# Patient Record
Sex: MALE | Race: WHITE | NOT HISPANIC OR LATINO | Employment: PART TIME | URBAN - METROPOLITAN AREA
[De-identification: names, ages, dates, MRNs, and addresses within clinical notes are randomized per-mention and may not be internally consistent; named-entity substitution may affect disease eponyms.]

---

## 2017-10-05 ENCOUNTER — TRANSCRIBE ORDERS (OUTPATIENT)
Dept: ADMINISTRATIVE | Facility: HOSPITAL | Age: 62
End: 2017-10-05

## 2017-10-05 DIAGNOSIS — R07.9 CHEST PAIN, UNSPECIFIED TYPE: Primary | ICD-10-CM

## 2017-10-26 ENCOUNTER — HOSPITAL ENCOUNTER (OUTPATIENT)
Dept: NON INVASIVE DIAGNOSTICS | Facility: CLINIC | Age: 62
Discharge: HOME | End: 2017-10-26
Payer: COMMERCIAL

## 2017-10-26 DIAGNOSIS — R07.9 CHEST PAIN, UNSPECIFIED TYPE: ICD-10-CM

## 2017-10-26 LAB
CHEST PAIN STATEMENT: NORMAL
MAX DIASTOLIC BP: 82 MMHG
MAX HEART RATE: 151 BPM
MAX PREDICTED HEART RATE: 157 BPM
MAX. SYSTOLIC BP: 190 MMHG
PROTOCOL NAME: NORMAL
TARGET HR FORMULA: NORMAL
TEST INDICATION: NORMAL
TIME IN EXERCISE PHASE: 405 S

## 2017-10-26 PROCEDURE — 93017 CV STRESS TEST TRACING ONLY: CPT

## 2017-10-26 PROCEDURE — A9502 TC99M TETROFOSMIN: HCPCS

## 2017-10-26 PROCEDURE — 78452 HT MUSCLE IMAGE SPECT MULT: CPT

## 2018-04-11 ENCOUNTER — HOSPITAL ENCOUNTER (EMERGENCY)
Facility: HOSPITAL | Age: 63
Discharge: HOME | End: 2018-04-11
Attending: EMERGENCY MEDICINE | Admitting: EMERGENCY MEDICINE
Payer: COMMERCIAL

## 2018-04-11 ENCOUNTER — APPOINTMENT (EMERGENCY)
Dept: CT IMAGING | Facility: HOSPITAL | Age: 63
End: 2018-04-11
Attending: EMERGENCY MEDICINE
Payer: COMMERCIAL

## 2018-04-11 ENCOUNTER — APPOINTMENT (EMERGENCY)
Dept: RADIOLOGY | Facility: HOSPITAL | Age: 63
End: 2018-04-11
Payer: COMMERCIAL

## 2018-04-11 VITALS
TEMPERATURE: 98.4 F | DIASTOLIC BLOOD PRESSURE: 68 MMHG | SYSTOLIC BLOOD PRESSURE: 132 MMHG | HEART RATE: 78 BPM | OXYGEN SATURATION: 94 % | WEIGHT: 195 LBS | RESPIRATION RATE: 18 BRPM

## 2018-04-11 DIAGNOSIS — B34.9 VIRAL ILLNESS: Primary | ICD-10-CM

## 2018-04-11 LAB
ALBUMIN SERPL BCP-MCNC: 3.8 G/DL (ref 3.5–5)
ALP SERPL-CCNC: 77 U/L (ref 46–116)
ALT SERPL W P-5'-P-CCNC: 44 U/L (ref 12–78)
ANION GAP SERPL CALCULATED.3IONS-SCNC: 16 MMOL/L (ref 4–13)
AST SERPL W P-5'-P-CCNC: 17 U/L (ref 5–45)
BACTERIA UR QL AUTO: NORMAL /HPF
BASOPHILS # BLD AUTO: 0.02 THOUSANDS/ΜL (ref 0–0.1)
BASOPHILS NFR BLD AUTO: 0 % (ref 0–1)
BILIRUB SERPL-MCNC: 0.6 MG/DL (ref 0.2–1)
BILIRUB UR QL STRIP: ABNORMAL
BUN SERPL-MCNC: 15 MG/DL (ref 5–25)
CALCIUM SERPL-MCNC: 8.9 MG/DL
CHLORIDE SERPL-SCNC: 103 MMOL/L (ref 100–108)
CLARITY UR: CLEAR
CO2 SERPL-SCNC: 21 MMOL/L (ref 21–32)
COLOR UR: YELLOW
CREAT SERPL-MCNC: 0.81 MG/DL (ref 0.6–1.3)
EOSINOPHIL # BLD AUTO: 0.12 THOUSAND/ΜL (ref 0–0.61)
EOSINOPHIL NFR BLD AUTO: 2 % (ref 0–6)
ERYTHROCYTE [DISTWIDTH] IN BLOOD BY AUTOMATED COUNT: 13.7 % (ref 11.6–15.1)
GFR SERPL CREATININE-BSD FRML MDRD: 95 ML/MIN/1.73SQ M
GLUCOSE SERPL-MCNC: 161 MG/DL (ref 65–140)
GLUCOSE UR STRIP-MCNC: ABNORMAL MG/DL
HCT VFR BLD AUTO: 48.3 % (ref 36.5–49.3)
HGB BLD-MCNC: 16.5 G/DL (ref 12–17)
HGB UR QL STRIP.AUTO: NEGATIVE
KETONES UR STRIP-MCNC: ABNORMAL MG/DL
LEUKOCYTE ESTERASE UR QL STRIP: NEGATIVE
LIPASE SERPL-CCNC: 87 U/L (ref 73–393)
LYMPHOCYTES # BLD AUTO: 1.82 THOUSANDS/ΜL (ref 0.6–4.47)
LYMPHOCYTES NFR BLD AUTO: 29 % (ref 14–44)
MCH RBC QN AUTO: 28.4 PG (ref 26.8–34.3)
MCHC RBC AUTO-ENTMCNC: 34.2 G/DL (ref 31.4–37.4)
MCV RBC AUTO: 83 FL (ref 82–98)
MONOCYTES # BLD AUTO: 0.72 THOUSAND/ΜL (ref 0.17–1.22)
MONOCYTES NFR BLD AUTO: 11 % (ref 4–12)
NEUTROPHILS # BLD AUTO: 3.71 THOUSANDS/ΜL (ref 1.85–7.62)
NEUTS SEG NFR BLD AUTO: 58 % (ref 43–75)
NITRITE UR QL STRIP: NEGATIVE
NON-SQ EPI CELLS URNS QL MICRO: NORMAL /HPF
PH UR STRIP.AUTO: 5 [PH] (ref 4.5–8)
PLATELET # BLD AUTO: 212 THOUSANDS/UL (ref 149–390)
PMV BLD AUTO: 10.2 FL (ref 8.9–12.7)
POTASSIUM SERPL-SCNC: 4 MMOL/L (ref 3.5–5.3)
PROT SERPL-MCNC: 7.1 G/DL (ref 6.4–8.2)
PROT UR STRIP-MCNC: ABNORMAL MG/DL
RBC # BLD AUTO: 5.8 MILLION/UL (ref 3.88–5.62)
RBC #/AREA URNS AUTO: NORMAL /HPF
SODIUM SERPL-SCNC: 140 MMOL/L (ref 136–145)
SP GR UR STRIP.AUTO: 1.02 (ref 1–1.03)
UROBILINOGEN UR QL STRIP.AUTO: 0.2 E.U./DL
WBC # BLD AUTO: 6.39 THOUSAND/UL (ref 4.31–10.16)
WBC #/AREA URNS AUTO: NORMAL /HPF

## 2018-04-11 PROCEDURE — 71045 X-RAY EXAM CHEST 1 VIEW: CPT

## 2018-04-11 PROCEDURE — 96361 HYDRATE IV INFUSION ADD-ON: CPT

## 2018-04-11 PROCEDURE — 85025 COMPLETE CBC W/AUTO DIFF WBC: CPT | Performed by: EMERGENCY MEDICINE

## 2018-04-11 PROCEDURE — 81001 URINALYSIS AUTO W/SCOPE: CPT

## 2018-04-11 PROCEDURE — 96375 TX/PRO/DX INJ NEW DRUG ADDON: CPT

## 2018-04-11 PROCEDURE — 36415 COLL VENOUS BLD VENIPUNCTURE: CPT | Performed by: EMERGENCY MEDICINE

## 2018-04-11 PROCEDURE — 80053 COMPREHEN METABOLIC PANEL: CPT | Performed by: EMERGENCY MEDICINE

## 2018-04-11 PROCEDURE — 93005 ELECTROCARDIOGRAM TRACING: CPT

## 2018-04-11 PROCEDURE — 96374 THER/PROPH/DIAG INJ IV PUSH: CPT

## 2018-04-11 PROCEDURE — 99285 EMERGENCY DEPT VISIT HI MDM: CPT

## 2018-04-11 PROCEDURE — 83690 ASSAY OF LIPASE: CPT | Performed by: EMERGENCY MEDICINE

## 2018-04-11 PROCEDURE — 74177 CT ABD & PELVIS W/CONTRAST: CPT

## 2018-04-11 RX ORDER — DICYCLOMINE HCL 20 MG
20 TABLET ORAL EVERY 6 HOURS
Qty: 20 TABLET | Refills: 0 | Status: SHIPPED | OUTPATIENT
Start: 2018-04-11 | End: 2019-12-05

## 2018-04-11 RX ORDER — ONDANSETRON 4 MG/1
4 TABLET, FILM COATED ORAL EVERY 6 HOURS
Qty: 12 TABLET | Refills: 0 | Status: SHIPPED | OUTPATIENT
Start: 2018-04-11 | End: 2019-12-05

## 2018-04-11 RX ORDER — ONDANSETRON 2 MG/ML
4 INJECTION INTRAMUSCULAR; INTRAVENOUS ONCE
Status: COMPLETED | OUTPATIENT
Start: 2018-04-11 | End: 2018-04-11

## 2018-04-11 RX ORDER — OMEPRAZOLE 20 MG/1
20 TABLET, DELAYED RELEASE ORAL DAILY
COMMUNITY
Start: 2015-11-07

## 2018-04-11 RX ORDER — KETOROLAC TROMETHAMINE 30 MG/ML
15 INJECTION, SOLUTION INTRAMUSCULAR; INTRAVENOUS ONCE
Status: COMPLETED | OUTPATIENT
Start: 2018-04-11 | End: 2018-04-11

## 2018-04-11 RX ADMIN — IOHEXOL 100 ML: 350 INJECTION, SOLUTION INTRAVENOUS at 20:40

## 2018-04-11 RX ADMIN — KETOROLAC TROMETHAMINE 15 MG: 30 INJECTION, SOLUTION INTRAMUSCULAR at 18:59

## 2018-04-11 RX ADMIN — ONDANSETRON 4 MG: 2 INJECTION INTRAMUSCULAR; INTRAVENOUS at 18:53

## 2018-04-11 RX ADMIN — SODIUM CHLORIDE 1000 ML: 0.9 INJECTION, SOLUTION INTRAVENOUS at 18:53

## 2018-04-11 NOTE — ED PROVIDER NOTES
History  Chief Complaint   Patient presents with    Abdominal Pain     Pt c/o lower abdominal pain for a few days along w/ nausea and vomiting  Pt c/o "looser stools" denies fevers     60year-old gentleman comes in with complaint of lower abdominal pain x3 days  Patient also complains of nausea vomiting and several episodes of loose stool  Patient describes abdominal pain as crampy  Patient thought he just had a GI bug but it is the lateral that is lasting longer he is concerned that there is something more wrong  Patient denies any fevers  Patient also denies any previous similar episodes  History provided by:  Patient   used: No    Abdominal Pain   Pain location:  Periumbilical and suprapubic  Pain quality: cramping    Pain radiates to:  Does not radiate  Pain severity:  Mild  Onset quality:  Gradual  Duration:  3 days  Timing:  Intermittent  Progression:  Waxing and waning  Chronicity:  New  Context: not alcohol use, not previous surgeries and not trauma    Ineffective treatments:  None tried  Associated symptoms: no anorexia, no chest pain, no cough, no fever, no hematuria and no vomiting    Risk factors: no alcohol abuse, no NSAID use and not pregnant        Prior to Admission Medications   Prescriptions Last Dose Informant Patient Reported? Taking? Albuterol Sulfate (PROAIR RESPICLICK) 050 (90 Base) MCG/ACT AEPB   Yes Yes   omeprazole (PRILOSEC OTC) 20 MG tablet   Yes Yes   Sig: Take 20 mg by mouth daily      Facility-Administered Medications: None       Past Medical History:   Diagnosis Date    Diabetes mellitus ([facility] [address] )        History reviewed  No pertinent surgical history  History reviewed  No pertinent family history  I have reviewed and agree with the history as documented      Social History   Substance Use Topics    Smoking status: Never Smoker    Smokeless tobacco: Not on file    Alcohol use No        Review of Systems   Constitutional: Negative for activity change, appetite change and fever  HENT: Negative for congestion and facial swelling  Eyes: Negative for discharge and redness  Respiratory: Negative for cough and wheezing  Cardiovascular: Negative for chest pain and leg swelling  Gastrointestinal: Positive for abdominal pain  Negative for abdominal distention, anorexia, blood in stool and vomiting  Endocrine: Negative for cold intolerance and polydipsia  Genitourinary: Negative for difficulty urinating and hematuria  Musculoskeletal: Negative for arthralgias and gait problem  Skin: Negative for color change and rash  Allergic/Immunologic: Negative for food allergies and immunocompromised state  Neurological: Negative for dizziness, seizures and headaches  Hematological: Negative for adenopathy  Does not bruise/bleed easily  Psychiatric/Behavioral: Negative for agitation, confusion and decreased concentration  All other systems reviewed and are negative  Physical Exam  ED Triage Vitals [04/11/18 1743]   Temperature Pulse Respirations Blood Pressure SpO2   98 4 °F (36 9 °C) (!) 106 18 142/66 99 %      Temp Source Heart Rate Source Patient Position - Orthostatic VS BP Location FiO2 (%)   Oral Monitor Sitting Left arm --      Pain Score       No Pain           Orthostatic Vital Signs  Vitals:    04/11/18 1743 04/11/18 1845 04/11/18 2100   BP: 142/66 155/82 132/68   Pulse: (!) 106 84 78   Patient Position - Orthostatic VS: Sitting Lying Lying       Physical Exam   Constitutional: He is oriented to person, place, and time  He appears well-developed and well-nourished  Non-toxic appearance  HENT:   Head: Normocephalic and atraumatic  Right Ear: Tympanic membrane normal    Left Ear: Tympanic membrane normal    Nose: Nose normal    Mouth/Throat: Oropharynx is clear and moist    Eyes: Conjunctivae, EOM and lids are normal  Pupils are equal, round, and reactive to light  Neck: Trachea normal and normal range of motion  Neck supple  No JVD present  Carotid bruit is not present  Cardiovascular: Normal rate, regular rhythm, normal heart sounds and intact distal pulses  No extrasystoles are present  Pulmonary/Chest: Effort normal  He has no decreased breath sounds  He has no wheezes  He has no rhonchi  He has no rales  He exhibits no tenderness and no deformity  Abdominal: Soft  Bowel sounds are normal  There is tenderness in the periumbilical area and suprapubic area  There is no rebound, no guarding and no CVA tenderness  Musculoskeletal:        Right shoulder: He exhibits normal range of motion, no tenderness, no swelling and no deformity  Cervical back: Normal  He exhibits normal range of motion, no tenderness, no bony tenderness and no deformity  Lymphadenopathy:     He has no cervical adenopathy  He has no axillary adenopathy  Neurological: He is alert and oriented to person, place, and time  He has normal strength and normal reflexes  No cranial nerve deficit or sensory deficit  He displays a negative Romberg sign  Skin: Skin is warm and dry  Psychiatric: He has a normal mood and affect  His speech is normal and behavior is normal  Judgment and thought content normal  Cognition and memory are normal    Nursing note and vitals reviewed        ED Medications  Medications   ketorolac (TORADOL) injection 15 mg (15 mg Intravenous Given 4/11/18 1859)   ondansetron (ZOFRAN) injection 4 mg (4 mg Intravenous Given 4/11/18 1853)   sodium chloride 0 9 % bolus 1,000 mL (0 mL Intravenous Stopped 4/11/18 2138)   iohexol (OMNIPAQUE) 350 MG/ML injection (MULTI-DOSE) 100 mL (100 mL Intravenous Given 4/11/18 2040)       Diagnostic Studies  Results Reviewed     Procedure Component Value Units Date/Time    Comprehensive metabolic panel [[id number]]  (Abnormal) Collected:  04/11/18 1953    Lab Status:  Final result Specimen:  Blood from Arm, Right Updated:  04/11/18 2028     Sodium 140 mmol/L      Potassium 4 0 mmol/L      Chloride 103 mmol/L      CO2 21 mmol/L      Anion Gap 16 (H) mmol/L      BUN 15 mg/dL      Creatinine 0 81 mg/dL      Glucose 161 (H) mg/dL      Calcium 8 9 mg/dL      AST 17 U/L      ALT 44 U/L      Alkaline Phosphatase 77 U/L      Total Protein 7 1 g/dL      Albumin 3 8 g/dL      Total Bilirubin 0 60 mg/dL      eGFR 95 ml/min/1 73sq m     Narrative:         National Kidney Disease Education Program recommendations are as follows:  GFR calculation is accurate only with a steady state creatinine  Chronic Kidney disease less than 60 ml/min/1 73 sq  meters  Kidney failure less than 15 ml/min/1 73 sq  meters      Lipase [[id number]]  (Normal) Collected:  04/11/18 1953    Lab Status:  Final result Specimen:  Blood from Arm, Right Updated:  04/11/18 2028     Lipase 87 u/L     Urine Microscopic [[id number]]  (Normal) Collected:  04/11/18 1939    Lab Status:  Final result Specimen:  Urine from Urine, Clean Catch Updated:  04/11/18 2024     RBC, UA None Seen /hpf      WBC, UA None Seen /hpf      Epithelial Cells None Seen /hpf      Bacteria, UA Occasional /hpf     ED Urine Macroscopic [[id number]]  (Abnormal) Collected:  04/11/18 1939    Lab Status:  Final result Specimen:  Urine Updated:  04/11/18 1938     Color, UA Yellow     Clarity, UA Clear     pH, UA 5 0     Leukocytes, UA Negative     Nitrite, UA Negative     Protein, UA Trace (A) mg/dl      Glucose,  (1/2%) (A) mg/dl      Ketones, UA >=160 (4+) (A) mg/dl      Urobilinogen, UA 0 2 E U /dl      Bilirubin, UA Interference- unable to analyze (A)     Blood, UA Negative     Specific Gravity, UA 1 020    Narrative:       CLINITEK RESULT    CBC and differential [[id number]]  (Abnormal) Collected:  04/11/18 1843    Lab Status:  Final result Specimen:  Blood from Arm, Right Updated:  04/11/18 1855     WBC 6 39 Thousand/uL      RBC 5 80 (H) Million/uL      Hemoglobin 16 5 g/dL      Hematocrit 48 3 %      MCV 83 fL      MCH 28 4 pg      MCHC 34 2 g/dL      RDW 13 7 %      MPV 10 2 fL Platelets 010 Thousands/uL      Neutrophils Relative 58 %      Lymphocytes Relative 29 %      Monocytes Relative 11 %      Eosinophils Relative 2 %      Basophils Relative 0 %      Neutrophils Absolute 3 71 Thousands/µL      Lymphocytes Absolute 1 82 Thousands/µL      Monocytes Absolute 0 72 Thousand/µL      Eosinophils Absolute 0 12 Thousand/µL      Basophils Absolute 0 02 Thousands/µL                  CT abdomen pelvis with contrast   Final Result by Alexandra Boogie MD (04/11 2053)      No acute intra-abdominal abnormality  No free air or free fluid  Scattered colonic diverticulosis with no inflammatory changes present to suggest acute diverticulitis  Diffuse fatty infiltration of the liver  Cholelithiasis with no pericholecystic inflammatory change  Workstation performed: [id number]         XR chest 1 view portable   Final Result by Sree Tamez MD (04/11 2029)      No acute cardiopulmonary disease  Workstation performed: [id number]                    Procedures  Procedures       Phone Contacts  ED Phone Contact    ED Course  ED Course                                MDM  Number of Diagnoses or Management Options  Viral illness: new and requires workup     Amount and/or Complexity of Data Reviewed  Clinical lab tests: ordered and reviewed  Tests in the radiology section of CPT®: ordered and reviewed  Tests in the medicine section of CPT®: ordered and reviewed    Patient Progress  Patient progress: improved    CritCare Time    Disposition  Final diagnoses:   Viral illness     Time reflects when diagnosis was documented in both MDM as applicable and the Disposition within this note     Time User Action Codes Description Comment    4/11/2018  9:03 PM Anisa Denise Add [B34 9] Viral illness       ED Disposition     ED Disposition Condition Comment    Discharge  [address] [facility] discharge to home/self care      Condition at discharge: Good        Follow-up Information Follow up With Specialties Details Why Contact Info    Ryan Jones MD [facility] Medicine Schedule an appointment as soon as possible for a visit  [address]          Discharge Medication List as of 4/11/2018  9:08 PM      START taking these medications    Details   dicyclomine (BENTYL) 20 mg tablet Take 1 tablet (20 mg total) by mouth every 6 (six) hours For crampy abdominal pain, Starting Wed 4/11/2018, Print      ondansetron (ZOFRAN) 4 mg tablet Take 1 tablet (4 mg total) by mouth every 6 (six) hours, Starting Wed 4/11/2018, Print         CONTINUE these medications which have NOT CHANGED    Details   Albuterol Sulfate (PROAIR RESPICLICK) 328 (90 Base) MCG/ACT AEPB Starting Wed 9/16/2015, Historical Med      omeprazole (PRILOSEC OTC) 20 MG tablet Take 20 mg by mouth daily, Starting Sat 11/7/2015, Historical Med           No discharge procedures on file      ED Provider  Electronically Signed by           Derrick Staples DO  04/15/18 1400

## 2018-04-12 LAB
ATRIAL RATE: 79 BPM
P AXIS: 33 DEGREES
PR INTERVAL: 140 MS
QRS AXIS: 57 DEGREES
QRSD INTERVAL: 84 MS
QT INTERVAL: 360 MS
QTC INTERVAL: 412 MS
T WAVE AXIS: 58 DEGREES
VENTRICULAR RATE: 79 BPM

## 2018-04-12 PROCEDURE — 93010 ELECTROCARDIOGRAM REPORT: CPT | Performed by: INTERNAL MEDICINE

## 2018-04-12 NOTE — ED NOTES
PT awake and alert, no distress noted, no further questions       April Argentina Winkler, RN  04/11/18 2247

## 2018-04-12 NOTE — DISCHARGE INSTRUCTIONS
Viral Syndrome, Ambulatory Care   GENERAL INFORMATION:   Viral syndrome  is a term healthcare providers use for general symptoms of a viral infection that has no clear cause  Common symptoms include the following:   · Fever and chills, or a rash    · Runny or stuffy nose     · Cough, sore throat, or hoarseness     · Headache, or pain and pressure around your eyes     · Muscle aches and joint pain     · Shortness of breath or wheezing     · Abdominal pain, cramps, and diarrhea     · Nausea, vomiting, or loss of appetite  Seek immediate care for the following symptoms:   · Continued vomiting and diarrhea    · Chest pain or trouble breathing  Treatment for a viral syndrome  may include medicines to decrease fever, cough, or stuffy nose  You may also need medicines to relieve a rash, itching, or help treat the viral infection  Manage your symptoms:  Drink liquids as directed to help prevent dehydration  Ask how much liquid to drink each day and which liquids are best for you  You may need to drink an oral rehydration solution (ORS)  An ORS has the right amounts of water, salts, and sugar you need to replace body fluids  Prevent the spread of viral syndrome:   · Wash your hands often  Use soap and water  Wash your hands after you use the bathroom, change a child's diapers, or sneeze  Wash your hands before you prepare or eat food  Use a gel hand  if soap and water are not available  · Wear a mask  to help prevent spreading the virus to others  · Cook and handle food properly  Cook food completely through  Clean food preparation surfaces with a disinfectant  · Ask about vaccinations  You may need an influenza (flu) vaccine, pneumococcal vaccine, or meningococcal vaccine  These vaccines help prevent the flu, pneumonia, and meningococcal disease  Follow up with your healthcare provider as directed:  Write down your questions so you remember to ask them during your visits     CARE AGREEMENT:   You have the right to help plan your care  Learn about your health condition and how it may be treated  Discuss treatment options with your caregivers to decide what care you want to receive  You always have the right to refuse treatment  The above information is an  only  It is not intended as medical advice for individual conditions or treatments  Talk to your doctor, nurse or pharmacist before following any medical regimen to see if it is safe and effective for you  © 2014 [address] is for End User's use only and may not be sold, redistributed or otherwise used for commercial purposes  All illustrations and images included in CareNotes® are the copyrighted property of [employer]  or Lenin Holman

## 2019-12-05 ENCOUNTER — HOSPITAL ENCOUNTER (OUTPATIENT)
Facility: HOSPITAL | Age: 64
Setting detail: OBSERVATION
Discharge: HOME | End: 2019-12-05
Attending: EMERGENCY MEDICINE | Admitting: FAMILY MEDICINE
Payer: COMMERCIAL

## 2019-12-05 ENCOUNTER — APPOINTMENT (EMERGENCY)
Dept: RADIOLOGY | Facility: HOSPITAL | Age: 64
End: 2019-12-05
Payer: COMMERCIAL

## 2019-12-05 VITALS
SYSTOLIC BLOOD PRESSURE: 126 MMHG | RESPIRATION RATE: 19 BRPM | DIASTOLIC BLOOD PRESSURE: 64 MMHG | OXYGEN SATURATION: 97 % | TEMPERATURE: 98 F | WEIGHT: 199.08 LBS | BODY MASS INDEX: 26.96 KG/M2 | HEART RATE: 74 BPM | HEIGHT: 72 IN

## 2019-12-05 DIAGNOSIS — R07.9 CHEST PAIN: Primary | ICD-10-CM

## 2019-12-05 DIAGNOSIS — J40 BRONCHITIS: ICD-10-CM

## 2019-12-05 PROBLEM — R07.89 CHEST HEAVINESS: Status: ACTIVE | Noted: 2019-12-05

## 2019-12-05 PROBLEM — E11.9 DIABETES MELLITUS (HCC): Status: ACTIVE | Noted: 2019-12-05

## 2019-12-05 PROBLEM — R05.9 COUGH: Status: ACTIVE | Noted: 2019-12-05

## 2019-12-05 LAB
ALBUMIN SERPL BCP-MCNC: 3.8 G/DL (ref 3.5–5)
ALP SERPL-CCNC: 65 U/L (ref 46–116)
ALT SERPL W P-5'-P-CCNC: 22 U/L (ref 12–78)
ANION GAP SERPL CALCULATED.3IONS-SCNC: 9 MMOL/L (ref 4–13)
APTT PPP: 29 SECONDS (ref 23–37)
AST SERPL W P-5'-P-CCNC: 12 U/L (ref 5–45)
BASE EX.OXY STD BLDV CALC-SCNC: 81.9 % (ref 60–80)
BASE EXCESS BLDV CALC-SCNC: -1.6 MMOL/L
BASOPHILS # BLD AUTO: 0.05 THOUSANDS/ΜL (ref 0–0.1)
BASOPHILS NFR BLD AUTO: 1 % (ref 0–1)
BILIRUB SERPL-MCNC: 0.5 MG/DL (ref 0.2–1)
BUN SERPL-MCNC: 22 MG/DL (ref 5–25)
CALCIUM SERPL-MCNC: 9 MG/DL (ref 8.3–10.1)
CHLORIDE SERPL-SCNC: 105 MMOL/L (ref 100–108)
CHOLEST SERPL-MCNC: 232 MG/DL (ref 50–200)
CK SERPL-CCNC: 36 U/L (ref 39–308)
CO2 SERPL-SCNC: 25 MMOL/L (ref 21–32)
CREAT SERPL-MCNC: 1.02 MG/DL (ref 0.6–1.3)
D DIMER PPP FEU-MCNC: 0.36 UG/ML FEU
EOSINOPHIL # BLD AUTO: 0.35 THOUSAND/ΜL (ref 0–0.61)
EOSINOPHIL NFR BLD AUTO: 6 % (ref 0–6)
ERYTHROCYTE [DISTWIDTH] IN BLOOD BY AUTOMATED COUNT: 13.1 % (ref 11.6–15.1)
EST. AVERAGE GLUCOSE BLD GHB EST-MCNC: 146 MG/DL
FLUAV RNA NPH QL NAA+PROBE: NORMAL
FLUBV RNA NPH QL NAA+PROBE: NORMAL
GFR SERPL CREATININE-BSD FRML MDRD: 77 ML/MIN/1.73SQ M
GLUCOSE SERPL-MCNC: 120 MG/DL (ref 65–140)
GLUCOSE SERPL-MCNC: 147 MG/DL (ref 65–140)
HBA1C MFR BLD: 6.7 % (ref 4.2–6.3)
HCO3 BLDV-SCNC: 22.9 MMOL/L (ref 24–30)
HCT VFR BLD AUTO: 45.4 % (ref 36.5–49.3)
HDLC SERPL-MCNC: 44 MG/DL
HGB BLD-MCNC: 15.3 G/DL (ref 12–17)
IMM GRANULOCYTES # BLD AUTO: 0.02 THOUSAND/UL (ref 0–0.2)
IMM GRANULOCYTES NFR BLD AUTO: 0 % (ref 0–2)
INR PPP: 0.92 (ref 0.84–1.19)
LACTATE SERPL-SCNC: 0.9 MMOL/L (ref 0.5–2)
LDLC SERPL CALC-MCNC: 171 MG/DL (ref 0–100)
LIPASE SERPL-CCNC: 120 U/L (ref 73–393)
LYMPHOCYTES # BLD AUTO: 2.19 THOUSANDS/ΜL (ref 0.6–4.47)
LYMPHOCYTES NFR BLD AUTO: 36 % (ref 14–44)
MCH RBC QN AUTO: 29.7 PG (ref 26.8–34.3)
MCHC RBC AUTO-ENTMCNC: 33.7 G/DL (ref 31.4–37.4)
MCV RBC AUTO: 88 FL (ref 82–98)
MONOCYTES # BLD AUTO: 0.61 THOUSAND/ΜL (ref 0.17–1.22)
MONOCYTES NFR BLD AUTO: 10 % (ref 4–12)
NEUTROPHILS # BLD AUTO: 2.87 THOUSANDS/ΜL (ref 1.85–7.62)
NEUTS SEG NFR BLD AUTO: 47 % (ref 43–75)
NONHDLC SERPL-MCNC: 188 MG/DL
NRBC BLD AUTO-RTO: 0 /100 WBCS
NT-PROBNP SERPL-MCNC: 14 PG/ML
O2 CT BLDV-SCNC: 18.4 ML/DL
PCO2 BLDV: 38 MM HG (ref 42–50)
PH BLDV: 7.4 [PH] (ref 7.3–7.4)
PLATELET # BLD AUTO: 204 THOUSANDS/UL (ref 149–390)
PLATELET # BLD AUTO: 208 THOUSANDS/UL (ref 149–390)
PMV BLD AUTO: 9.5 FL (ref 8.9–12.7)
PMV BLD AUTO: 9.6 FL (ref 8.9–12.7)
PO2 BLDV: 46.2 MM HG (ref 35–45)
POTASSIUM SERPL-SCNC: 3.7 MMOL/L (ref 3.5–5.3)
PROCALCITONIN SERPL-MCNC: <0.05 NG/ML
PROT SERPL-MCNC: 7 G/DL (ref 6.4–8.2)
PROTHROMBIN TIME: 11.8 SECONDS (ref 11.6–14.5)
RBC # BLD AUTO: 5.16 MILLION/UL (ref 3.88–5.62)
RSV RNA NPH QL NAA+PROBE: NORMAL
SODIUM SERPL-SCNC: 139 MMOL/L (ref 136–145)
TRIGL SERPL-MCNC: 83 MG/DL
TROPONIN I SERPL-MCNC: 0 NG/ML
TROPONIN I SERPL-MCNC: <0.02 NG/ML
WBC # BLD AUTO: 6.09 THOUSAND/UL (ref 4.31–10.16)

## 2019-12-05 PROCEDURE — 84484 ASSAY OF TROPONIN QUANT: CPT | Performed by: PHYSICIAN ASSISTANT

## 2019-12-05 PROCEDURE — 80061 LIPID PANEL: CPT | Performed by: PHYSICIAN ASSISTANT

## 2019-12-05 PROCEDURE — 85610 PROTHROMBIN TIME: CPT | Performed by: EMERGENCY MEDICINE

## 2019-12-05 PROCEDURE — 99285 EMERGENCY DEPT VISIT HI MDM: CPT | Performed by: EMERGENCY MEDICINE

## 2019-12-05 PROCEDURE — 84145 PROCALCITONIN (PCT): CPT | Performed by: PHYSICIAN ASSISTANT

## 2019-12-05 PROCEDURE — 94640 AIRWAY INHALATION TREATMENT: CPT

## 2019-12-05 PROCEDURE — 94664 DEMO&/EVAL PT USE INHALER: CPT

## 2019-12-05 PROCEDURE — 87040 BLOOD CULTURE FOR BACTERIA: CPT | Performed by: EMERGENCY MEDICINE

## 2019-12-05 PROCEDURE — 99285 EMERGENCY DEPT VISIT HI MDM: CPT

## 2019-12-05 PROCEDURE — 36415 COLL VENOUS BLD VENIPUNCTURE: CPT | Performed by: EMERGENCY MEDICINE

## 2019-12-05 PROCEDURE — 85025 COMPLETE CBC W/AUTO DIFF WBC: CPT | Performed by: EMERGENCY MEDICINE

## 2019-12-05 PROCEDURE — 83690 ASSAY OF LIPASE: CPT | Performed by: EMERGENCY MEDICINE

## 2019-12-05 PROCEDURE — 85379 FIBRIN DEGRADATION QUANT: CPT | Performed by: EMERGENCY MEDICINE

## 2019-12-05 PROCEDURE — 99220 PR INITIAL OBSERVATION CARE/DAY 70 MINUTES: CPT | Performed by: PHYSICIAN ASSISTANT

## 2019-12-05 PROCEDURE — 94760 N-INVAS EAR/PLS OXIMETRY 1: CPT

## 2019-12-05 PROCEDURE — 82550 ASSAY OF CK (CPK): CPT | Performed by: EMERGENCY MEDICINE

## 2019-12-05 PROCEDURE — 85049 AUTOMATED PLATELET COUNT: CPT | Performed by: PHYSICIAN ASSISTANT

## 2019-12-05 PROCEDURE — 82805 BLOOD GASES W/O2 SATURATION: CPT | Performed by: EMERGENCY MEDICINE

## 2019-12-05 PROCEDURE — 85730 THROMBOPLASTIN TIME PARTIAL: CPT | Performed by: EMERGENCY MEDICINE

## 2019-12-05 PROCEDURE — 84484 ASSAY OF TROPONIN QUANT: CPT | Performed by: EMERGENCY MEDICINE

## 2019-12-05 PROCEDURE — 83605 ASSAY OF LACTIC ACID: CPT | Performed by: EMERGENCY MEDICINE

## 2019-12-05 PROCEDURE — 93005 ELECTROCARDIOGRAM TRACING: CPT

## 2019-12-05 PROCEDURE — 80053 COMPREHEN METABOLIC PANEL: CPT | Performed by: EMERGENCY MEDICINE

## 2019-12-05 PROCEDURE — 87631 RESP VIRUS 3-5 TARGETS: CPT | Performed by: EMERGENCY MEDICINE

## 2019-12-05 PROCEDURE — 83036 HEMOGLOBIN GLYCOSYLATED A1C: CPT | Performed by: PHYSICIAN ASSISTANT

## 2019-12-05 PROCEDURE — 99217 PR OBSERVATION CARE DISCHARGE MANAGEMENT: CPT | Performed by: HOSPITALIST

## 2019-12-05 PROCEDURE — 83880 ASSAY OF NATRIURETIC PEPTIDE: CPT | Performed by: EMERGENCY MEDICINE

## 2019-12-05 PROCEDURE — 71046 X-RAY EXAM CHEST 2 VIEWS: CPT

## 2019-12-05 PROCEDURE — 82948 REAGENT STRIP/BLOOD GLUCOSE: CPT

## 2019-12-05 RX ORDER — ACETAMINOPHEN 325 MG/1
650 TABLET ORAL EVERY 4 HOURS PRN
Status: DISCONTINUED | OUTPATIENT
Start: 2019-12-05 | End: 2019-12-05 | Stop reason: HOSPADM

## 2019-12-05 RX ORDER — ASPIRIN 81 MG/1
162 TABLET, CHEWABLE ORAL ONCE
Status: COMPLETED | OUTPATIENT
Start: 2019-12-05 | End: 2019-12-05

## 2019-12-05 RX ORDER — ALBUTEROL SULFATE 2.5 MG/3ML
2.5 SOLUTION RESPIRATORY (INHALATION) ONCE
Status: COMPLETED | OUTPATIENT
Start: 2019-12-05 | End: 2019-12-05

## 2019-12-05 RX ORDER — LEVALBUTEROL 1.25 MG/.5ML
1.25 SOLUTION, CONCENTRATE RESPIRATORY (INHALATION)
Status: DISCONTINUED | OUTPATIENT
Start: 2019-12-05 | End: 2019-12-05 | Stop reason: HOSPADM

## 2019-12-05 RX ORDER — ONDANSETRON 2 MG/ML
4 INJECTION INTRAMUSCULAR; INTRAVENOUS EVERY 6 HOURS PRN
Status: DISCONTINUED | OUTPATIENT
Start: 2019-12-05 | End: 2019-12-05 | Stop reason: HOSPADM

## 2019-12-05 RX ORDER — SODIUM CHLORIDE FOR INHALATION 0.9 %
VIAL, NEBULIZER (ML) INHALATION
Status: COMPLETED
Start: 2019-12-05 | End: 2019-12-05

## 2019-12-05 RX ORDER — ALBUTEROL SULFATE 2.5 MG/3ML
2.5 SOLUTION RESPIRATORY (INHALATION) EVERY 6 HOURS PRN
Status: DISCONTINUED | OUTPATIENT
Start: 2019-12-05 | End: 2019-12-05 | Stop reason: HOSPADM

## 2019-12-05 RX ORDER — BENZONATATE 100 MG/1
100 CAPSULE ORAL 3 TIMES DAILY PRN
Status: DISCONTINUED | OUTPATIENT
Start: 2019-12-05 | End: 2019-12-05 | Stop reason: HOSPADM

## 2019-12-05 RX ORDER — PANTOPRAZOLE SODIUM 40 MG/1
40 TABLET, DELAYED RELEASE ORAL
Status: DISCONTINUED | OUTPATIENT
Start: 2019-12-05 | End: 2019-12-05 | Stop reason: HOSPADM

## 2019-12-05 RX ORDER — GUAIFENESIN 600 MG
600 TABLET, EXTENDED RELEASE 12 HR ORAL EVERY 12 HOURS SCHEDULED
Status: DISCONTINUED | OUTPATIENT
Start: 2019-12-05 | End: 2019-12-05 | Stop reason: HOSPADM

## 2019-12-05 RX ADMIN — LEVALBUTEROL 1.25 MG: 1.25 SOLUTION, CONCENTRATE RESPIRATORY (INHALATION) at 07:16

## 2019-12-05 RX ADMIN — IPRATROPIUM BROMIDE 0.5 MG: 0.5 SOLUTION RESPIRATORY (INHALATION) at 07:16

## 2019-12-05 RX ADMIN — ASPIRIN 81 MG 162 MG: 81 TABLET ORAL at 00:44

## 2019-12-05 RX ADMIN — ALBUTEROL SULFATE 2.5 MG: 2.5 SOLUTION RESPIRATORY (INHALATION) at 00:44

## 2019-12-05 RX ADMIN — ISODIUM CHLORIDE 3 ML: 0.03 SOLUTION RESPIRATORY (INHALATION) at 00:44

## 2019-12-05 RX ADMIN — PANTOPRAZOLE SODIUM 40 MG: 40 TABLET, DELAYED RELEASE ORAL at 06:31

## 2019-12-05 NOTE — ED PROVIDER NOTES
History  Chief Complaint   Patient presents with    Breathing Difficulty     Pt reports having a cough, labored breathing, and wheezing that has been going on for several days  -fevers     Patient is a 71year old male with a few days of worsening chest heaviness pain, sob, wheezing, orthopnea and productive cough  No fever  No N/V  No travel  Denies smoking  No CAD in family  Has been taking Bronkaid over the counter for wheezing and last used this at [facility] website checked on this patient and no Rx found  Was last seen in this ED on 4/11/18 for viral illness  History provided by:  Patient   used: No        Prior to Admission Medications   Prescriptions Last Dose Informant Patient Reported? Taking? Albuterol Sulfate (PROAIR RESPICLICK) 443 (90 Base) MCG/ACT AEPB   Yes No   omeprazole (PRILOSEC OTC) 20 MG tablet   Yes No   Sig: Take 20 mg by mouth daily      Facility-Administered Medications: None       Past Medical History:   Diagnosis Date    Diabetes mellitus ([facility] [address] )        History reviewed  No pertinent surgical history  History reviewed  No pertinent family history  I have reviewed and agree with the history as documented  Social History     Tobacco Use    Smoking status: Never Smoker    Smokeless tobacco: Never Used   Substance Use Topics    Alcohol use: No    Drug use: No        Review of Systems   Constitutional: Negative for fever  Respiratory: Positive for cough, shortness of breath and wheezing  Cardiovascular: Positive for chest pain  Gastrointestinal: Negative for nausea and vomiting  All other systems reviewed and are negative  Physical Exam  Physical Exam   Constitutional: He is oriented to person, place, and time  He appears well-developed and well-nourished  He appears distressed (moderate)  HENT:   Head: Normocephalic and atraumatic  Mouth/Throat: Oropharynx is clear and moist    Eyes: No scleral icterus     Neck: Normal range of motion  Neck supple  No JVD present  No tracheal deviation present  Cardiovascular: Normal rate, regular rhythm and normal heart sounds  No murmur heard  Pulmonary/Chest: Effort normal and breath sounds normal  No stridor  No respiratory distress  He has no wheezes  He has no rales  He exhibits no tenderness  Abdominal: Soft  Bowel sounds are normal  He exhibits no distension  There is no tenderness  Musculoskeletal: He exhibits no edema or deformity  Neurological: He is alert and oriented to person, place, and time  Skin: Skin is warm and dry  No rash noted  Psychiatric:   Somewhat anxious  Nursing note and vitals reviewed        Vital Signs  ED Triage Vitals   Temperature Pulse Respirations Blood Pressure SpO2   12/05/19 0009 12/05/19 0009 12/05/19 0009 12/05/19 0009 12/05/19 0010   98 5 °F (36 9 °C) 87 20 141/64 95 %      Temp Source Heart Rate Source Patient Position - Orthostatic VS BP Location FiO2 (%)   12/05/19 0009 12/05/19 0009 12/05/19 0009 12/05/19 0009 --   Oral Monitor Sitting Left arm       Pain Score       12/05/19 0009       2           Vitals:    12/05/19 0009 12/05/19 0124   BP: 141/64 118/70   Pulse: 87 80   Patient Position - Orthostatic VS: Sitting Sitting         Visual Acuity      ED Medications  Medications   nitroglycerin (NITRO-BID) 2 % TD ointment 0 5 inch (0 inches Topical Hold 12/5/19 0044)   albuterol inhalation solution 2 5 mg (2 5 mg Nebulization Given 12/5/19 0044)   aspirin chewable tablet 162 mg (162 mg Oral Given 12/5/19 0044)   sodium chloride 0 9 % inhalation solution **ADS Override Pull** (3 mL  Given 12/5/19 0044)       Diagnostic Studies  Results Reviewed     Procedure Component Value Units Date/Time    Influenza A/B and RSV PCR [[id number]]  (Normal) Collected:  12/05/19 0057    Lab Status:  Final result Specimen:  Nasopharyngeal from Nose Updated:  12/05/19 0155     INFLUENZA A PCR None Detected     INFLUENZA B PCR None Detected     RSV PCR None Detected    Comprehensive metabolic panel [[id number]]  (Abnormal) Collected:  12/05/19 0057    Lab Status:  Final result Specimen:  Blood from Arm, Right Updated:  12/05/19 0135     Sodium 139 mmol/L      Potassium 3 7 mmol/L      Chloride 105 mmol/L      CO2 25 mmol/L      ANION GAP 9 mmol/L      BUN 22 mg/dL      Creatinine 1 02 mg/dL      Glucose 147 mg/dL      Calcium 9 0 mg/dL      AST 12 U/L      ALT 22 U/L      Alkaline Phosphatase 65 U/L      Total Protein 7 0 g/dL      Albumin 3 8 g/dL      Total Bilirubin 0 50 mg/dL      eGFR 77 ml/min/1 73sq m     Narrative:       [place] guidelines for Chronic Kidney Disease (CKD):     Stage 1 with normal or high GFR (GFR > 90 mL/min/1 73 square meters)    Stage 2 Mild CKD (GFR = 60-89 mL/min/1 73 square meters)    Stage 3A Moderate CKD (GFR = 45-59 mL/min/1 73 square meters)    Stage 3B Moderate CKD (GFR = 30-44 mL/min/1 73 square meters)    Stage 4 Severe CKD (GFR = 15-29 mL/min/1 73 square meters)    Stage 5 End Stage CKD (GFR <15 mL/min/1 73 square meters)  Note: GFR calculation is accurate only with a steady state creatinine    Lipase [[id number]]  (Normal) Collected:  12/05/19 0057    Lab Status:  Final result Specimen:  Blood from Arm, Right Updated:  12/05/19 0135     Lipase 120 u/L     CK Total with Reflex CKMB [[id number]]  (Abnormal) Collected:  12/05/19 0057    Lab Status:  Final result Specimen:  Blood from Arm, Right Updated:  12/05/19 0135     Total CK 36 U/L     NT-BNP PRO [[id number]]  (Normal) Collected:  12/05/19 0057    Lab Status:  Final result Specimen:  Blood from Arm, Right Updated:  12/05/19 0135     NT-proBNP 14 pg/mL     Lactic acid, plasma [[id number]]  (Normal) Collected:  12/05/19 0057    Lab Status:  Final result Specimen:  Blood from Arm, Right Updated:  12/05/19 0129     LACTIC ACID 0 9 mmol/L     Narrative:       Result may be elevated if tourniquet was used during collection      Troponin I [[id number]] (Normal) Collected:  12/05/19 0057    Lab Status:  Final result Specimen:  Blood from Arm, Right Updated:  12/05/19 0126     Troponin I <0 02 ng/mL     D-Dimer [[id number]]  (Normal) Collected:  12/05/19 0057    Lab Status:  Final result Specimen:  Blood from Arm, Right Updated:  12/05/19 0121     D-Dimer, Quant 0 36 ug/ml FEU     Protime-INR [[id number]]  (Normal) Collected:  12/05/19 0057    Lab Status:  Final result Specimen:  Blood from Arm, Right Updated:  12/05/19 0119     Protime 11 8 seconds      INR 0 92    APTT [[id number]]  (Normal) Collected:  12/05/19 0057    Lab Status:  Final result Specimen:  Blood from Arm, Right Updated:  12/05/19 0119     PTT 29 seconds     CBC and differential [[id number]] Collected:  12/05/19 0057    Lab Status:  Final result Specimen:  Blood from Arm, Right Updated:  12/05/19 0109     WBC 6 09 Thousand/uL      RBC 5 16 Million/uL      Hemoglobin 15 3 g/dL      Hematocrit 45 4 %      MCV 88 fL      MCH 29 7 pg      MCHC 33 7 g/dL      RDW 13 1 %      MPV 9 5 fL      Platelets 722 Thousands/uL      nRBC 0 /100 WBCs      Neutrophils Relative 47 %      Immat GRANS % 0 %      Lymphocytes Relative 36 %      Monocytes Relative 10 %      Eosinophils Relative 6 %      Basophils Relative 1 %      Neutrophils Absolute 2 87 Thousands/µL      Immature Grans Absolute 0 02 Thousand/uL      Lymphocytes Absolute 2 19 Thousands/µL      Monocytes Absolute 0 61 Thousand/µL      Eosinophils Absolute 0 35 Thousand/µL      Basophils Absolute 0 05 Thousands/µL     Blood gas, venous [[id number]]  (Abnormal) Collected:  12/05/19 0057    Lab Status:  Final result Specimen:  Blood from Arm, Right Updated:  12/05/19 0108     pH, Louis 7 397     pCO2, Louis 38 0 mm Hg      pO2, Louis 46 2 mm Hg      HCO3, Louis 22 9 mmol/L      Base Excess, Louis -1 6 mmol/L      O2 Content, Louis 18 4 ml/dL      O2 HGB, VENOUS 81 9 %     Blood culture #1 [[id number]] Collected:  12/05/19 0057    Lab Status:   In process Specimen:  Blood from Arm, Right Updated:  12/05/19 0105    Blood culture #2 [[id number]] Collected:  12/05/19 0057    Lab Status: In process Specimen:  Blood from Hand, Left Updated:  12/05/19 0105                 XR chest 2 views   ED Interpretation by Amy Sage MD (12/05 0214)   Increased interstitial markings read by me  Procedures  ECG 12 Lead Documentation Only  Date/Time: 12/5/2019 1:18 AM  Performed by: Amy Sage MD  Authorized by: Amy Sage MD     Indications / Diagnosis:  Chest pain ,sob  ECG reviewed by me, the ED Provider: yes    Patient location:  ED  Previous ECG:     Previous ECG:  Compared to current    Comparison ECG info:  4/11/18    Similarity:  No change  Quality:     Tracing quality:  Limited by artifact  Rate:     ECG rate:  80    ECG rate assessment: normal    Rhythm:     Rhythm: sinus rhythm    Ectopy:     Ectopy: none    QRS:     QRS axis:  Normal    QRS intervals:  Normal  Conduction:     Conduction: normal    ST segments:     ST segments:  Normal  T waves:     T waves: non-specific               ED Course  ED Course as of Dec 05 0224   Thu Dec 05, 2019   0220 Labs, CXR d/w patient  Patient feeling better               HEART Risk Score      Most Recent Value   History  1 Filed at: 12/05/2019 0133   ECG  1 Filed at: 12/05/2019 0133   Age  2 Filed at: 12/05/2019 0133   Risk Factors  1 Filed at: 12/05/2019 0133   Troponin  0 Filed at: 12/05/2019 0133   Heart Score Risk Calculator   History  1 Filed at: 12/05/2019 0133   ECG  1 Filed at: 12/05/2019 0133   Age  2 Filed at: 12/05/2019 0133   Risk Factors  1 Filed at: 12/05/2019 0133   Troponin  0 Filed at: 12/05/2019 0133   HEART Score  5 Filed at: 12/05/2019 0133   HEART Score  5 Filed at: 12/05/2019 0133                    RALF Risk Score      Most Recent Value   Age >= 72  1 Filed at: 12/05/2019 0134   Known CAD (stenosis >= 50%)  0 Filed at: 12/05/2019 0134   Recent (<=24 hrs) Service Angina  0 Filed at: 12/05/2019 0134   ST Deviation >= 0 5 mm  0 Filed at: 12/05/2019 0134   3+ CAD Risk Factors (FHx, HTN, HLP, DM, Smoker)  0 Filed at: 12/05/2019 0134   Aspirin Use Past 7 Days  0 Filed at: 12/05/2019 0134   Elevated Cardiac Markers  0 Filed at: 12/05/2019 0134   RALF Risk Score (Calculated)  1 Filed at: 12/05/2019 0134              MDM  Number of Diagnoses or Management Options  Diagnosis management comments: DDX including but not limited to: pneumonia, pleural effusion, CHF, PE, PTX, ACS, MI, asthma exacerbation, COPD exacerbation, anemia, metabolic abnormality, renal failure, anxiety; doubt rhabdomyolysis or dissection  Amount and/or Complexity of Data Reviewed  Clinical lab tests: ordered and reviewed  Tests in the radiology section of CPT®: ordered and reviewed  Decide to obtain previous medical records or to obtain history from someone other than the patient: yes  Independent visualization of images, tracings, or specimens: yes          Disposition  Final diagnoses:   Chest pain   Bronchitis     Time reflects when diagnosis was documented in both MDM as applicable and the Disposition within this note     Time User Action Codes Description Comment    12/5/2019  2:23 AM Welby Seip Add [R07 9] Chest pain     12/5/2019  2:23 AM Kaye Lambert Bronchitis       ED Disposition     ED Disposition Condition Date/Time Comment    Admit Stable Thu Dec 5, 2019  2:23 AM Case was discussed with REG Figueredo and the patient's admission status was agreed to be Admission Status: observation status to the service of Dr Beulah Jose   Follow-up Information    None         Patient's Medications   Discharge Prescriptions    No medications on file     No discharge procedures on file      ED Provider  Electronically Signed by           Catie Mayen MD  12/05/19 8233

## 2019-12-05 NOTE — H&P
H&P- [facility] [birth year], 71 y o  male MRN: [id number]    Unit/Bed#: S -01 Encounter: [id number]    Primary Care Provider: Lázaro Granados MD   Date and time admitted to hospital: 12/5/2019 12:25 AM        * Cough  Assessment & Plan  · Presented with cough, wheezing/ SOB and chest pain  · Suspect acute viral bronchitis  · No acute findings noted on CXR  · Follow-up on official results in AM    · Flu and RSV negative  · Check procalcitonin level  · Pulse ox stable on RA  · Does not meet sepsis criteria as he is afebrile and without leukocytosis or tachycardia/ tachypnea  Chest pain  Assessment & Plan  · Patient reports upper chest pain/ discomfort  · Suspect pleuritic pain in the setting of bronchitis/ coughing  · Initial troponin negative  · Continue to trend troponin  · No acute changes noted on EKG  · Monitor on telemetry  · Last stress test was in October 2017 was a normal study  · Consider repeat stress test as an outpatient if pain persists  Mild intermittent asthma  Assessment & Plan  · Respiratory protocol  · PRN albuterol  Diabetes mellitus ([facility] [address] )  Assessment & Plan  No results found for: HGBA1C    No results for input(s): POCGLU in the last 72 hours  Blood Sugar Average: Last 72 hrs:    · Hold oral medications while inpatient  · Monitor accu-checks AC and HS  · SSI for coverage  VTE Prophylaxis: Enoxaparin (Lovenox)  / sequential compression device   Code Status: Level 1- Full Code  POLST: POLST form is not discussed and not completed at this time  Anticipated Length of Stay:  Patient will be admitted on an Observation basis with an anticipated length of stay of less than 2 midnights  Justification for Hospital Stay: chest pain    Total Time for Visit, including Counseling / Coordination of Care: 1 hour  Greater than 50% of this total time spent on direct patient counseling and coordination of care      Chief Complaint:   Chest pain, cough and SOB    History of Present Illness:    Loni Resendiz is a 71 y o  male with a history of diabetes and allergies who presents with chest pain, SOB and cough  Patient reports a non-productive cough for the past 3 days as well as SOB and chest pain  He notes some intermittent chest pain located in the center of his upper chest the past few days as well which worsens with coughing  He notes that last evening he went to lay down to go to sleep and had some increased SOB, heard himself wheezing which prompted him to present to the hospital  While in the ED, he received an albuterol nebulizer treatment and currently reports improvement in his symptoms  He denies fevers/ chills, abdominal pain, nausea/ vomiting, dizziness or lightheadedness  He also denies any sick contacts  He states that he has been using Bronkaid at home since his symptoms began  He denies a history of COPD but notes that he has asthma/ allergies and uses his albuterol inhaler at night occasionally  Review of Systems:    Review of Systems   Respiratory: Positive for cough, chest tightness, shortness of breath and wheezing  Cardiovascular: Positive for chest pain  All other systems reviewed and are negative  Past Medical and Surgical History:     Past Medical History:   Diagnosis Date    Diabetes mellitus ([facility] [address] )     Mild intermittent asthma        History reviewed  No pertinent surgical history  Meds/Allergies:    Prior to Admission medications    Medication Sig Start Date End Date Taking?  Authorizing Provider   Albuterol Sulfate (PROAIR RESPICLICK) 175 (90 Base) MCG/ACT AEPB  9/16/15   Historical Provider, MD   omeprazole (PRILOSEC OTC) 20 MG tablet Take 20 mg by mouth daily 11/7/15   Historical Provider, MD   dicyclomine (BENTYL) 20 mg tablet Take 1 tablet (20 mg total) by mouth every 6 (six) hours For crampy abdominal pain 4/11/18 12/5/19  Dari Coffman DO   ondansetron (ZOFRAN) 4 mg tablet Take 1 tablet (4 mg total) by mouth every 6 (six) hours 4/11/18 12/5/19  Danial Jean DO     I have reviewed home medications with patient personally  Allergies: Allergies   Allergen Reactions    Pollen Extract Allergic Rhinitis       Social History:     Marital Status: /Civil Union   Patient Pre-hospital Living Situation: home  Patient Pre-hospital Level of Mobility: independent  Patient Pre-hospital Diet Restrictions: none  Substance Use History:   Social History     Substance and Sexual Activity   Alcohol Use No     Social History     Tobacco Use   Smoking Status Never Smoker   Smokeless Tobacco Never Used     Social History     Substance and Sexual Activity   Drug Use No       Family History:    History reviewed  No pertinent family history  Physical Exam:     Vitals:   Blood Pressure: 115/59 (12/05/19 0300)  Pulse: 84 (12/05/19 0300)  Temperature: 97 8 °F (36 6 °C) (12/05/19 0300)  Temp Source: Oral (12/05/19 0300)  Respirations: 19 (12/05/19 0300)  SpO2: 97 % (12/05/19 0300)    Physical Exam   Constitutional: He is oriented to person, place, and time  He appears well-developed and well-nourished  No distress  HENT:   Head: Normocephalic and atraumatic  Eyes: Conjunctivae are normal    Neck: Neck supple  Cardiovascular: Normal rate and regular rhythm  Pulmonary/Chest: Effort normal and breath sounds normal  No respiratory distress  Abdominal: Soft  Bowel sounds are normal  There is no tenderness  Musculoskeletal: Normal range of motion  He exhibits no edema  Neurological: He is alert and oriented to person, place, and time  Skin: Skin is warm and dry  He is not diaphoretic  No erythema  Psychiatric: He has a normal mood and affect  Nursing note and vitals reviewed  Additional Data:     Lab Results: I have personally reviewed pertinent reports        Results from last 7 days   Lab Units 12/05/19  0057   WBC Thousand/uL 6 09   HEMOGLOBIN g/dL 15 3   HEMATOCRIT % 45 4   PLATELETS Thousands/uL 208   NEUTROS PCT % 47   LYMPHS PCT % 36   MONOS PCT % 10   EOS PCT % 6     Results from last 7 days   Lab Units 12/05/19  0057   SODIUM mmol/L 139   POTASSIUM mmol/L 3 7   CHLORIDE mmol/L 105   CO2 mmol/L 25   BUN mg/dL 22   CREATININE mg/dL 1 02   ANION GAP mmol/L 9   CALCIUM mg/dL 9 0   ALBUMIN g/dL 3 8   TOTAL BILIRUBIN mg/dL 0 50   ALK PHOS U/L 65   ALT U/L 22   AST U/L 12   GLUCOSE RANDOM mg/dL 147*     Results from last 7 days   Lab Units 12/05/19  0057   INR  0 92             Results from last 7 days   Lab Units 12/05/19  0057   LACTIC ACID mmol/L 0 9       Imaging: I have personally reviewed pertinent reports  and I have personally reviewed pertinent films in PACS    XR chest 2 views   ED Interpretation by Lisa Contreras MD (12/05 0214)   Increased interstitial markings read by me  EKG, Pathology, and Other Studies Reviewed on Admission:   · EKG: normal sinus rhythm, nonspecific T wave abnormality  Allscripts / Epic Records Reviewed: Yes     ** Please Note: This note has been constructed using a voice recognition system   **

## 2019-12-05 NOTE — DISCHARGE SUMMARY
Discharge- Johnathan Francois 1955, 71 y o  male MRN: [id number]    Unit/Bed#: S -01 Encounter: [id number]    Primary Care Provider: Neo Rosenthal MD   Date and time admitted to hospital: 12/5/2019 12:25 AM    Chest heaviness  Assessment & Plan  · Patient reports upper chest pain/ discomfort  · Suspect pleuritic pain in the setting of bronchitis/ coughing  · Initial troponin negative  · Continue to trend troponin  · No acute changes noted on EKG  · Monitor on telemetry  · Last stress test was in October 2017 was a normal study  · Consider repeat stress test as an outpatient if pain persists  Mild intermittent asthma  Assessment & Plan  · Respiratory protocol  · PRN albuterol  Diabetes mellitus ([facility] [address] )  Assessment & Plan  No results found for: HGBA1C    Recent Labs     12/05/19  0655   POCGLU 120       Blood Sugar Average: Last 72 hrs:  (P) 120  · Hold oral medications while inpatient  · Monitor accu-checks AC and HS  · SSI for coverage  Discharging Physician / Practitioner: Mik Iqbal MD  PCP: Neo Rosenthal MD  Admission Date:   Admission Orders (From admission, onward)     Ordered        12/05/19 0223  Place in Observation  Once                   Discharge Date: 12/05/19    Resolved Problems  Date Reviewed: 12/5/2019    None          Consultations During Hospital Stay:  · None     Procedures Performed:   · None     Significant Findings / Test Results:   · Trop: negative     Incidental Findings:   · None      Test Results Pending at Discharge (will require follow up): · None      Outpatient Tests Requested:  · None     Complications:  None     Reason for Admission:  Cough     Hospital Course:     Johnathan Francois is a 71 y o  male patient history of asthma who originally presented to the hospital on 12/5/2019 due to cough with wheezing  Patient also reported feeling like his chest was heavy when he lays down at  He had no fevers or chills  His symptoms improved with nebulizers  The ED provider checked heart score given the report of chest heaviness  Troponins were checked x2 were negative  EKG was unremarkable  Patient was recommended to continue his inhaler for symptoms of wheeziness and shortness of breath  He has follow-up on Tuesday with his primary care provider  Please see above list of diagnoses and related plan for additional information  Condition at Discharge: good     Discharge Day Visit / Exam:     Subjective:  Feels better  Cough has improved  Vitals: Blood Pressure: 126/64 (12/05/19 0729)  Pulse: 74 (12/05/19 0729)  Temperature: 98 °F (36 7 °C) (12/05/19 0729)  Temp Source: Oral (12/05/19 0729)  Respirations: 19 (12/05/19 0729)  Height: 6' (182 9 cm) (12/05/19 0555)  Weight - Scale: 90 3 kg (199 lb 1 2 oz) (12/05/19 0459)  SpO2: 97 % (12/05/19 0729)  Exam:   Physical Exam   Constitutional: No distress  HENT:   Head: Normocephalic and atraumatic  Cardiovascular: Normal rate and regular rhythm  No murmur heard  Pulmonary/Chest: Effort normal and breath sounds normal  No stridor  No respiratory distress  He has no wheezes  Abdominal: Soft  Bowel sounds are normal  He exhibits no distension  There is no tenderness  There is no guarding  Musculoskeletal: Normal range of motion  Neurological: He is alert  Skin: He is not diaphoretic  Nursing note and vitals reviewed  Discharge instructions/Information to patient and family:   See after visit summary for information provided to patient and family  Provisions for Follow-Up Care:  See after visit summary for information related to follow-up care and any pertinent home health orders  Disposition:     Home        Planned Readmission: none      Discharge Statement:  I spent 40 minutes discharging the patient  This time was spent on the day of discharge  I had direct contact with the patient on the day of discharge   Greater than 50% of the total time was spent examining patient, answering all patient questions, arranging and discussing plan of care with patient as well as directly providing post-discharge instructions  Additional time then spent on discharge activities  Discharge Medications:  See after visit summary for reconciled discharge medications provided to patient and family        ** Please Note: This note has been constructed using a voice recognition system **

## 2019-12-05 NOTE — RESPIRATORY THERAPY NOTE
RT Protocol Note  Iain Salvage 71 y o  male MRN: [id number]  Unit/Bed#: S -01 Encounter: [id number]    Assessment    Principal Problem:    Cough  Active Problems:    Chest pain    Diabetes mellitus (HCC)    Mild intermittent asthma      Home Pulmonary Medications:  Albuterol prn       Past Medical History:   Diagnosis Date    Diabetes mellitus ([facility] [address] )     Mild intermittent asthma      Social History     Socioeconomic History    Marital status: /Civil Union     Spouse name: None    Number of children: None    Years of education: None    Highest education level: None   Occupational History    None   Social Needs    Financial resource strain: None    Food insecurity:     Worry: None     Inability: None    Transportation needs:     Medical: None     Non-medical: None   Tobacco Use    Smoking status: Never Smoker    Smokeless tobacco: Never Used   Substance and Sexual Activity    Alcohol use: No    Drug use: No    Sexual activity: None   Lifestyle    Physical activity:     Days per week: None     Minutes per session: None    Stress: None   Relationships    Social connections:     Talks on phone: None     Gets together: None     Attends Shinto service: None     Active member of club or organization: None     Attends meetings of clubs or organizations: None     Relationship status: None    Intimate partner violence:     Fear of current or ex partner: None     Emotionally abused: None     Physically abused: None     Forced sexual activity: None   Other Topics Concern    None   Social History Narrative    None       Subjective         Objective    Physical Exam:   Assessment Type: Assess only  General Appearance: Awake, Alert  Respiratory Pattern: Normal  Chest Assessment: Chest expansion symmetrical  Bilateral Breath Sounds: Diminished, Expiratory wheezes(faint)    Vitals:  Blood pressure 115/59, pulse 84, temperature 97 8 °F (36 6 °C), temperature source Oral, resp   rate 19, SpO2 97 %           Imaging and other studies: I have personally reviewed pertinent reports  Plan    Respiratory Plan: No distress/Pulmonary history        Resp Comments: Pt assessed per protocol  Pt states he has past pulmonary hx of asthma and takes albuterol prn at home  Will order pt BID XOP/ATR  Will continue to monitor

## 2019-12-05 NOTE — PLAN OF CARE
Problem: PAIN - ADULT  Goal: Verbalizes/displays adequate comfort level or baseline comfort level  Description  Interventions:  - Encourage patient to monitor pain and request assistance  - Assess pain using appropriate pain scale  - Administer analgesics based on type and severity of pain and evaluate response  - Implement non-pharmacological measures as appropriate and evaluate response  - Consider cultural and social influences on pain and pain management  - Notify physician/advanced practitioner if interventions unsuccessful or patient reports new pain  Outcome: Progressing     Problem: INFECTION - ADULT  Goal: Absence or prevention of progression during hospitalization  Description  INTERVENTIONS:  - Assess and monitor for signs and symptoms of infection  - Monitor lab/diagnostic results  - Monitor all insertion sites, i e  indwelling lines, tubes, and drains  - Monitor endotracheal if appropriate and nasal secretions for changes in amount and color  - [place] appropriate cooling/warming therapies per order  - Administer medications as ordered  - Instruct and encourage patient and family to use good hand hygiene technique  - Identify and instruct in appropriate isolation precautions for identified infection/condition  Outcome: Progressing  Goal: Absence of fever/infection during neutropenic period  Description  INTERVENTIONS:  - Monitor WBC    Outcome: Progressing     Problem: SAFETY ADULT  Goal: Patient will remain free of falls  Description  INTERVENTIONS:  - Assess patient frequently for physical needs  -  Identify cognitive and physical deficits and behaviors that affect risk of falls    -  [place] fall precautions as indicated by assessment   - Educate patient/family on patient safety including physical limitations  - Instruct patient to call for assistance with activity based on assessment  - Modify environment to reduce risk of injury  - Consider OT/PT consult to assist with strengthening/mobility  Outcome: Progressing  Goal: Maintain or return to baseline ADL function  Description  INTERVENTIONS:  -  Assess patient's ability to carry out ADLs; assess patient's baseline for ADL function and identify physical deficits which impact ability to perform ADLs (bathing, care of mouth/teeth, toileting, grooming, dressing, etc )  - Assess/evaluate cause of self-care deficits   - Assess range of motion  - Assess patient's mobility; develop plan if impaired  - Assess patient's need for assistive devices and provide as appropriate  - Encourage maximum independence but intervene and supervise when necessary  - Involve family in performance of ADLs  - Assess for home care needs following discharge   - Consider OT consult to assist with ADL evaluation and planning for discharge  - Provide patient education as appropriate  Outcome: Progressing  Goal: Maintain or return mobility status to optimal level  Description  INTERVENTIONS:  - Assess patient's baseline mobility status (ambulation, transfers, stairs, etc )    - Identify cognitive and physical deficits and behaviors that affect mobility  - Identify mobility aids required to assist with transfers and/or ambulation (gait belt, sit-to-stand, lift, walker, cane, etc )  - [place] fall precautions as indicated by assessment  - Record patient progress and toleration of activity level on Mobility SBAR; progress patient to next Phase/Stage  - Instruct patient to call for assistance with activity based on assessment  - Consider rehabilitation consult to assist with strengthening/weightbearing, etc   Outcome: Progressing     Problem: DISCHARGE PLANNING  Goal: Discharge to home or other facility with appropriate resources  Description  INTERVENTIONS:  - Identify barriers to discharge w/patient and caregiver  - Arrange for needed discharge resources and transportation as appropriate  - Identify discharge learning needs (meds, wound care, etc )  - Arrange for interpretive services to assist at discharge as needed  - Refer to Case Management Department for coordinating discharge planning if the patient needs post-hospital services based on physician/advanced practitioner order or complex needs related to functional status, cognitive ability, or social support system  Outcome: Progressing     Problem: Knowledge Deficit  Goal: Patient/family/caregiver demonstrates understanding of disease process, treatment plan, medications, and discharge instructions  Description  Complete learning assessment and assess knowledge base    Interventions:  - Provide teaching at level of understanding  - Provide teaching via preferred learning methods  Outcome: Progressing     Problem: CARDIOVASCULAR - ADULT  Goal: Maintains optimal cardiac output and hemodynamic stability  Description  INTERVENTIONS:  - Monitor I/O, vital signs and rhythm  - Monitor for S/S and trends of decreased cardiac output  - Administer and titrate ordered vasoactive medications to optimize hemodynamic stability  - Assess quality of pulses, skin color and temperature  - Assess for signs of decreased coronary artery perfusion  - Instruct patient to report change in severity of symptoms  Outcome: Progressing  Goal: Absence of cardiac dysrhythmias or at baseline rhythm  Description  INTERVENTIONS:  - Continuous cardiac monitoring, vital signs, obtain 12 lead EKG if ordered  - Administer antiarrhythmic and heart rate control medications as ordered  - Monitor electrolytes and administer replacement therapy as ordered  Outcome: Progressing     Problem: RESPIRATORY - ADULT  Goal: Achieves optimal ventilation and oxygenation  Description  INTERVENTIONS:  - Assess for changes in respiratory status  - Assess for changes in mentation and behavior  - Position to facilitate oxygenation and minimize respiratory effort  - Oxygen administered by appropriate delivery if ordered  - Initiate smoking cessation education as indicated  - Encourage broncho-pulmonary hygiene including cough, deep breathe, Incentive Spirometry  - Assess the need for suctioning and aspirate as needed  - Assess and instruct to report SOB or any respiratory difficulty  - Respiratory Therapy support as indicated  Outcome: Progressing

## 2019-12-06 LAB
ATRIAL RATE: 80 BPM
P AXIS: 14 DEGREES
PR INTERVAL: 148 MS
QRS AXIS: 40 DEGREES
QRSD INTERVAL: 86 MS
QT INTERVAL: 352 MS
QTC INTERVAL: 405 MS
T WAVE AXIS: 57 DEGREES
VENTRICULAR RATE: 80 BPM

## 2019-12-06 PROCEDURE — 93010 ELECTROCARDIOGRAM REPORT: CPT | Performed by: INTERNAL MEDICINE

## 2019-12-06 NOTE — UTILIZATION REVIEW
Notification of Observation Admission/Observation Authorization Request   This is a Notification of Observation Admission for [address]  Be advised that this patient was admitted to our facility under Observation Status  Contact Lilian Mendoza at [phone] for additional admission information  Ritchie Solis  DEPT  DEDICATED [phone]  Patient Name:   Keo Souza   YOB: 1955       Authorization Information   [facility]   [address]  Tax ID: [id number]  NPI: [id number] Attending Provider/NPI: Chandrika Saxena Md [[id number]]   Attending Physician:  LEO Robins  [facility] Practioner ID- [id number]  [address] [place] [address]  Phone 1: [phone]  Fax: [phone]   Place of Service Code: 25     Place of Service Name:  CPT Code for Observation:  On [address] / CPT [address]   Start Date:      Discharge Date & Time: 12/5/2019  8:52 AM    Type of Admission: Observation Status Discharge Disposition   (if discharged): Home/Self Care   Patient Diagnoses: Chest pain [R07 9]  Bronchitis [J40]  Breathing difficulty [R06 89]     Orders: Admission Orders (From admission, onward)     Ordered        12/05/19 0223  Place in Observation  Once                    Assigned Utilization Review Contact: Lilian Mendoza  Utilization   Network Utilization Review Department  Phone: [phone]; Fax [phone]  Email: [email or web address]   ATTENTION PAYERS: Please call the assigned Utilization  directly with any questions or concerns ALL voicemails in the department are confidential  Send all requests for admission clinical reviews, approved or denied determinations and any other requests to dedicated fax number belonging to the campus where the patient is receiving treatment

## 2019-12-10 LAB
BACTERIA BLD CULT: NORMAL
BACTERIA BLD CULT: NORMAL

## 2022-03-26 NOTE — ASSESSMENT & PLAN NOTE
No results found for: HGBA1C    No results for input(s): POCGLU in the last 72 hours  Blood Sugar Average: Last 72 hrs:    · Hold oral medications while inpatient  · Monitor accu-checks AC and HS  · SSI for coverage 
No results found for: HGBA1C    Recent Labs     12/05/19  0655   POCGLU 120       Blood Sugar Average: Last 72 hrs:  (P) 120  · Hold oral medications while inpatient  · Monitor accu-checks AC and HS  · SSI for coverage 
· Patient reports upper chest pain/ discomfort  · Suspect pleuritic pain in the setting of bronchitis/ coughing  · Initial troponin negative  · Continue to trend troponin  · No acute changes noted on EKG  · Monitor on telemetry  · Last stress test was in October 2017 was a normal study  · Consider repeat stress test as an outpatient if pain persists 
· Patient reports upper chest pain/ discomfort  · Suspect pleuritic pain in the setting of bronchitis/ coughing  · Initial troponin negative  · Continue to trend troponin  · No acute changes noted on EKG  · Monitor on telemetry  · Last stress test was in October 2017 was a normal study  · Consider repeat stress test as an outpatient if pain persists 
· Presented with cough, wheezing/ SOB and chest pain  · Suspect acute viral bronchitis  · No acute findings noted on CXR  · Follow-up on official results in AM    · Flu and RSV negative  · Check procalcitonin level  · Pulse ox stable on RA  · Does not meet sepsis criteria as he is afebrile and without leukocytosis or tachycardia/ tachypnea 
· Respiratory protocol  · PRN albuterol 
· Respiratory protocol  · PRN albuterol 
Awake

## 2024-02-21 PROBLEM — R05.9 COUGH: Status: RESOLVED | Noted: 2019-12-05 | Resolved: 2024-02-21

## 2024-11-25 ENCOUNTER — HOSPITAL ENCOUNTER (EMERGENCY)
Facility: HOSPITAL | Age: 69
Discharge: HOME | End: 2024-11-25
Attending: EMERGENCY MEDICINE
Payer: COMMERCIAL

## 2024-11-25 ENCOUNTER — APPOINTMENT (EMERGENCY)
Dept: RADIOLOGY | Facility: HOSPITAL | Age: 69
End: 2024-11-25
Payer: COMMERCIAL

## 2024-11-25 VITALS
BODY MASS INDEX: 27.57 KG/M2 | TEMPERATURE: 100.2 F | DIASTOLIC BLOOD PRESSURE: 67 MMHG | OXYGEN SATURATION: 96 % | HEART RATE: 86 BPM | SYSTOLIC BLOOD PRESSURE: 133 MMHG | RESPIRATION RATE: 18 BRPM | WEIGHT: 203.26 LBS

## 2024-11-25 DIAGNOSIS — R03.0 ELEVATED BLOOD PRESSURE READING: ICD-10-CM

## 2024-11-25 DIAGNOSIS — R81 GLUCOSURIA: ICD-10-CM

## 2024-11-25 DIAGNOSIS — R07.9 ACUTE CHEST PAIN: Primary | ICD-10-CM

## 2024-11-25 DIAGNOSIS — R06.00 DYSPNEA: ICD-10-CM

## 2024-11-25 DIAGNOSIS — R06.02 SHORTNESS OF BREATH: ICD-10-CM

## 2024-11-25 LAB
2HR DELTA HS TROPONIN: <-1 NG/L
ALBUMIN SERPL BCG-MCNC: 4.6 G/DL (ref 3.5–5)
ALP SERPL-CCNC: 74 U/L (ref 34–104)
ALT SERPL W P-5'-P-CCNC: 20 U/L (ref 7–52)
ANION GAP SERPL CALCULATED.3IONS-SCNC: 10 MMOL/L (ref 4–13)
AST SERPL W P-5'-P-CCNC: 18 U/L (ref 13–39)
ATRIAL RATE: 92 BPM
BACTERIA UR QL AUTO: NORMAL /HPF
BASOPHILS # BLD AUTO: 0.04 THOUSANDS/ΜL (ref 0–0.1)
BASOPHILS NFR BLD AUTO: 1 % (ref 0–1)
BILIRUB SERPL-MCNC: 1.3 MG/DL (ref 0.2–1)
BILIRUB UR QL STRIP: NEGATIVE
BNP SERPL-MCNC: 19 PG/ML (ref 0–100)
BUN SERPL-MCNC: 18 MG/DL (ref 5–25)
CALCIUM SERPL-MCNC: 9 MG/DL (ref 8.4–10.2)
CARDIAC TROPONIN I PNL SERPL HS: 3 NG/L (ref ?–50)
CARDIAC TROPONIN I PNL SERPL HS: <2 NG/L (ref ?–50)
CHLORIDE SERPL-SCNC: 105 MMOL/L (ref 96–108)
CLARITY UR: CLEAR
CO2 SERPL-SCNC: 23 MMOL/L (ref 21–32)
COLOR UR: ABNORMAL
CREAT SERPL-MCNC: 0.75 MG/DL (ref 0.6–1.3)
D DIMER PPP FEU-MCNC: 0.33 UG/ML FEU
EOSINOPHIL # BLD AUTO: 0.13 THOUSAND/ΜL (ref 0–0.61)
EOSINOPHIL NFR BLD AUTO: 2 % (ref 0–6)
ERYTHROCYTE [DISTWIDTH] IN BLOOD BY AUTOMATED COUNT: 13.3 % (ref 11.6–15.1)
FLUAV AG UPPER RESP QL IA.RAPID: NEGATIVE
FLUBV AG UPPER RESP QL IA.RAPID: NEGATIVE
GFR SERPL CREATININE-BSD FRML MDRD: 92 ML/MIN/1.73SQ M
GLUCOSE SERPL-MCNC: 154 MG/DL (ref 65–140)
GLUCOSE UR STRIP-MCNC: ABNORMAL MG/DL
HCT VFR BLD AUTO: 47 % (ref 36.5–49.3)
HGB BLD-MCNC: 16.1 G/DL (ref 12–17)
HGB UR QL STRIP.AUTO: NEGATIVE
IMM GRANULOCYTES # BLD AUTO: 0.01 THOUSAND/UL (ref 0–0.2)
IMM GRANULOCYTES NFR BLD AUTO: 0 % (ref 0–2)
KETONES UR STRIP-MCNC: ABNORMAL MG/DL
LEUKOCYTE ESTERASE UR QL STRIP: ABNORMAL
LYMPHOCYTES # BLD AUTO: 1.06 THOUSANDS/ΜL (ref 0.6–4.47)
LYMPHOCYTES NFR BLD AUTO: 18 % (ref 14–44)
MCH RBC QN AUTO: 30 PG (ref 26.8–34.3)
MCHC RBC AUTO-ENTMCNC: 34.3 G/DL (ref 31.4–37.4)
MCV RBC AUTO: 88 FL (ref 82–98)
MONOCYTES # BLD AUTO: 0.61 THOUSAND/ΜL (ref 0.17–1.22)
MONOCYTES NFR BLD AUTO: 11 % (ref 4–12)
NEUTROPHILS # BLD AUTO: 3.94 THOUSANDS/ΜL (ref 1.85–7.62)
NEUTS SEG NFR BLD AUTO: 68 % (ref 43–75)
NITRITE UR QL STRIP: NEGATIVE
NON-SQ EPI CELLS URNS QL MICRO: NORMAL /HPF
NRBC BLD AUTO-RTO: 0 /100 WBCS
P AXIS: 62 DEGREES
PH UR STRIP.AUTO: 5 [PH]
PLATELET # BLD AUTO: 198 THOUSANDS/UL (ref 149–390)
PMV BLD AUTO: 9.4 FL (ref 8.9–12.7)
POTASSIUM SERPL-SCNC: 3.9 MMOL/L (ref 3.5–5.3)
PR INTERVAL: 148 MS
PROT SERPL-MCNC: 7.2 G/DL (ref 6.4–8.4)
PROT UR STRIP-MCNC: NEGATIVE MG/DL
QRS AXIS: 59 DEGREES
QRSD INTERVAL: 82 MS
QT INTERVAL: 354 MS
QTC INTERVAL: 437 MS
RBC # BLD AUTO: 5.36 MILLION/UL (ref 3.88–5.62)
RBC #/AREA URNS AUTO: NORMAL /HPF
SARS-COV+SARS-COV-2 AG RESP QL IA.RAPID: NEGATIVE
SODIUM SERPL-SCNC: 138 MMOL/L (ref 135–147)
SP GR UR STRIP.AUTO: 1.04 (ref 1–1.03)
T WAVE AXIS: 85 DEGREES
UROBILINOGEN UR STRIP-ACNC: <2 MG/DL
VENTRICULAR RATE: 92 BPM
WBC # BLD AUTO: 5.79 THOUSAND/UL (ref 4.31–10.16)
WBC #/AREA URNS AUTO: NORMAL /HPF

## 2024-11-25 PROCEDURE — 87804 INFLUENZA ASSAY W/OPTIC: CPT

## 2024-11-25 PROCEDURE — 93005 ELECTROCARDIOGRAM TRACING: CPT

## 2024-11-25 PROCEDURE — 36415 COLL VENOUS BLD VENIPUNCTURE: CPT

## 2024-11-25 PROCEDURE — 84484 ASSAY OF TROPONIN QUANT: CPT

## 2024-11-25 PROCEDURE — 93010 ELECTROCARDIOGRAM REPORT: CPT | Performed by: INTERNAL MEDICINE

## 2024-11-25 PROCEDURE — 87811 SARS-COV-2 COVID19 W/OPTIC: CPT

## 2024-11-25 PROCEDURE — 96360 HYDRATION IV INFUSION INIT: CPT

## 2024-11-25 PROCEDURE — 85379 FIBRIN DEGRADATION QUANT: CPT

## 2024-11-25 PROCEDURE — 80053 COMPREHEN METABOLIC PANEL: CPT

## 2024-11-25 PROCEDURE — 83880 ASSAY OF NATRIURETIC PEPTIDE: CPT

## 2024-11-25 PROCEDURE — 99285 EMERGENCY DEPT VISIT HI MDM: CPT

## 2024-11-25 PROCEDURE — 71046 X-RAY EXAM CHEST 2 VIEWS: CPT

## 2024-11-25 PROCEDURE — 81001 URINALYSIS AUTO W/SCOPE: CPT | Performed by: EMERGENCY MEDICINE

## 2024-11-25 PROCEDURE — 99285 EMERGENCY DEPT VISIT HI MDM: CPT | Performed by: EMERGENCY MEDICINE

## 2024-11-25 PROCEDURE — 85025 COMPLETE CBC W/AUTO DIFF WBC: CPT

## 2024-11-25 RX ORDER — ACETAMINOPHEN 325 MG/1
650 TABLET ORAL ONCE
Status: COMPLETED | OUTPATIENT
Start: 2024-11-25 | End: 2024-11-25

## 2024-11-25 RX ADMIN — SODIUM CHLORIDE 1000 ML: 0.9 INJECTION, SOLUTION INTRAVENOUS at 08:27

## 2024-11-25 RX ADMIN — ACETAMINOPHEN 650 MG: 325 TABLET, FILM COATED ORAL at 07:24

## 2024-11-25 NOTE — ED PROVIDER NOTES
Time reflects when diagnosis was documented in both MDM as applicable and the Disposition within this note       Time User Action Codes Description Comment    11/25/2024  7:14 AM Juliann, Christopher Add [R03.0] Elevated blood pressure reading     11/25/2024  7:14 AM Juliann, Christopher Add [R06.02] Shortness of breath     11/25/2024  7:14 AM Juliann, Christopher Add [R06.00] Dyspnea     11/25/2024  9:16 AM Juliann, Christopher Add [R81] Glucosuria     11/25/2024  9:16 AM Legare, Christopher A Modify [R03.0] Elevated blood pressure reading     11/25/2024  9:16 AM Legare, Christopher A Modify [R06.02] Shortness of breath     11/25/2024  9:16 AM Legare, Christopher A Add [R07.9] Acute chest pain     11/25/2024  9:16 AM Legare, Christopher A Modify [R06.02] Shortness of breath     11/25/2024  9:16 AM Legare, Christopher A Modify [R07.9] Acute chest pain           ED Disposition       ED Disposition   Discharge    Condition   Stable    Date/Time   Mon Nov 25, 2024  9:16 AM    Comment   Marko Su discharge to home/self care.                   Assessment & Plan       Medical Decision Making  69-year-old male presenting to the ED for complaint of chest tightness, cough, congestion, shortness of breath.    Differential includes viral illness, flu, COVID, ACS, MI.  Low suspicion of PE, Wells score is 0.  Patient oxygen saturations are 96% on room air and not tachycardic.    Will get CBC, CMP, flu COVID, troponin, EKG, x-ray.    See ED course for interpretation of results.    Following workup, patient reassessed.  Patient reassured that it does not appear that he has an ACS, MI, PE.  Patient comfortable with following up with primary care provider and continuing symptomatic care at home.  Discussed with patient methods of symptomatic care including ibuprofen versus Tylenol as well as honey.    Patient comfortable with this plan and comfortable with discharge.  At time of discharge, patient's vitals were stable and patient is  ambulating appropriately.    Patient discharged.    Amount and/or Complexity of Data Reviewed  Labs: ordered. Decision-making details documented in ED Course.  Radiology: ordered and independent interpretation performed.    Risk  OTC drugs.        ED Course as of 11/25/24 1546   Mon Nov 25, 2024   0625 Impression evaluation, not concerning for acute systemic infection.  Will continue to evaluate and reassess.  Patient is not SIRS positive at this time.   0650 CBC and differential  Unremarkable and reassuring   0656 GLUCOSE(!): 154  elevated   0656 Total Bilirubin(!): 1.30  elevated   0656 Comprehensive metabolic panel(!)  Elevated glucose and total bili otherwise unremarkable and reassuring CMP.   0656 FLU/COVID Rapid Antigen (30 min. TAT) - Preferred screening test in ED  All negative   0700 Chest x-ray shows no acute or obvious pneumonia, infiltrate, effusion on my personal interpretation.   0701 Will add D-dimer at this time.   0702 hs TnI 0hr: 3  Reassuring.   0800 D-Dimer, Quant: 0.33  Within normal limits, reassuring.   0800 BNP: 19  Normal limits and reassuring.   0909 hs TnI 2hr: <2  reassuring   0916 Leukocytes, UA(!): Elevated glucose may cause decreased leukocyte values. See urine microscopic for UWBC result   0916 Glucose, UA(!): >=1000 (1%)  elevated       Medications   acetaminophen (TYLENOL) tablet 650 mg (650 mg Oral Given 11/25/24 0724)   sodium chloride 0.9 % bolus 1,000 mL (0 mL Intravenous Stopped 11/25/24 0946)       ED Risk Strat Scores   HEART Risk Score      Flowsheet Row Most Recent Value   Heart Score Risk Calculator    History 1 Filed at: 11/25/2024 0713   ECG 0 Filed at: 11/25/2024 0713   Age 2 Filed at: 11/25/2024 0713   Risk Factors 2 Filed at: 11/25/2024 0713   Troponin 0 Filed at: 11/25/2024 0713   HEART Score 5 Filed at: 11/25/2024 0713                                   Wells' Criteria for PE      Flowsheet Row Most Recent Value   Wells' Criteria for PE    Clinical signs and symptoms  of DVT 0 Filed at: 11/25/2024 0745   PE is primary diagnosis or equally likely 0 Filed at: 11/25/2024 0745   HR >100 0 Filed at: 11/25/2024 0745   Immobilization at least 3 days or Surgery in the previous 4 weeks 0 Filed at: 11/25/2024 0745   Previous, objectively diagnosed PE or DVT 0 Filed at: 11/25/2024 0745   Hemoptysis 0 Filed at: 11/25/2024 0745   Malignancy with treatment within 6 months or palliative 0 Filed at: 11/25/2024 0745   Wells' Criteria Total 0 Filed at: 11/25/2024 0745                        History of Present Illness       Chief Complaint   Patient presents with    Shortness of Breath     Pt complaining of SOB, chest pain, since Saturday, chest discomfort gets worse with activity. No N/V or fever       Past Medical History:   Diagnosis Date    Diabetes mellitus (HCC)     Mild intermittent asthma       No past surgical history on file.   No family history on file.   Social History     Tobacco Use    Smoking status: Former     Types: Cigarettes    Smokeless tobacco: Never   Substance Use Topics    Alcohol use: No    Drug use: No      E-Cigarette/Vaping      E-Cigarette/Vaping Substances      I have reviewed and agree with the history as documented.     69-year-old male with significant history of asthma, diabetes presenting to the ED with chest tightness, dyspnea, cough with mucus production for the past 2 days.  Patient states that he was at home with his daughter who has not had similar symptoms.  Patient states that his symptoms started out of nowhere.  Patient describes the tightness as worse when he is laying down and better when he standing up.  Patient notes that his mucus production is clear and he feels a drip in the back of his throat.  Patient states he took a cold and flu medication at home yesterday once but is not sure what the name of it is.  Patient also was unsure the name of his other medications that he takes.  Patient is otherwise denying chest pain, abdominal pain, nausea,  vomiting, diarrhea, headache, lightheadedness.        Review of Systems   Constitutional:  Negative for chills and fever.   HENT:  Positive for postnasal drip, rhinorrhea and sinus pressure. Negative for congestion, ear discharge, facial swelling, nosebleeds, sore throat and voice change.    Eyes:  Negative for visual disturbance.   Respiratory:  Positive for cough, chest tightness and shortness of breath.    Gastrointestinal:  Negative for abdominal pain, diarrhea, nausea and vomiting.   Genitourinary:  Negative for dysuria.   Musculoskeletal:  Negative for arthralgias, back pain, myalgias and neck pain.   Skin:  Negative for color change.   Neurological:  Negative for dizziness, weakness, light-headedness and headaches.   Psychiatric/Behavioral:  Negative for agitation and confusion.            Objective       ED Triage Vitals   Temperature Pulse Blood Pressure Respirations SpO2 Patient Position - Orthostatic VS   11/25/24 0621 11/25/24 0619 11/25/24 0619 11/25/24 0619 11/25/24 0619 11/25/24 0619   99.9 °F (37.7 °C) 92 162/77 22 98 % Lying      Temp Source Heart Rate Source BP Location FiO2 (%) Pain Score    11/25/24 0730 11/25/24 0619 11/25/24 0619 -- 11/25/24 0724    Rectal Monitor Right arm  3      Vitals      Date and Time Temp Pulse SpO2 Resp BP Pain Score FACES Pain Rating User   11/25/24 0831 -- 86 96 % 18 133/67 No Pain -- TA   11/25/24 0830 -- 88 96 % 22 133/67 -- -- AD   11/25/24 0801 -- 84 94 % -- -- -- -- CD   11/25/24 0730 100.2 °F (37.9 °C) -- -- -- -- -- -- AD   11/25/24 0724 -- -- -- -- -- 3 -- TA   11/25/24 0622 -- 89 96 % -- 162/77 -- -- CD   11/25/24 0621 99.9 °F (37.7 °C) -- -- -- -- -- -- CD   11/25/24 0619 -- 92 98 % 22 162/77 -- -- JR            Physical Exam  Constitutional:       Appearance: He is well-developed.   HENT:      Head: Normocephalic and atraumatic.      Mouth/Throat:      Mouth: Mucous membranes are moist.   Eyes:      Pupils: Pupils are equal, round, and reactive to light.    Cardiovascular:      Rate and Rhythm: Normal rate and regular rhythm.   Pulmonary:      Effort: Pulmonary effort is normal.      Breath sounds: No decreased breath sounds, wheezing, rhonchi or rales.   Abdominal:      Palpations: Abdomen is soft.   Musculoskeletal:         General: Normal range of motion.      Cervical back: Normal range of motion.   Skin:     General: Skin is warm.      Capillary Refill: Capillary refill takes less than 2 seconds.   Neurological:      General: No focal deficit present.      Mental Status: He is alert and oriented to person, place, and time.   Psychiatric:         Mood and Affect: Mood normal.         Behavior: Behavior normal.         Results Reviewed       Procedure Component Value Units Date/Time    Urine Microscopic [[id number]]  (Normal) Collected: 11/25/24 0826    Lab Status: Final result Specimen: Urine, Clean Catch Updated: 11/25/24 1009     RBC, UA None Seen /hpf      WBC, UA None Seen /hpf      Epithelial Cells None Seen /hpf      Bacteria, UA None Seen /hpf     UA w Reflex to Microscopic w Reflex to Culture [[id number]]  (Abnormal) Collected: 11/25/24 0826    Lab Status: Final result Specimen: Urine, Clean Catch Updated: 11/25/24 0911     Color, UA Light Yellow     Clarity, UA Clear     Specific Gravity, UA 1.044     pH, UA 5.0     Leukocytes, UA Elevated glucose may cause decreased leukocyte values. See urine microscopic for UWBC result     Nitrite, UA Negative     Protein, UA Negative mg/dl      Glucose, UA >=1000 (1%) mg/dl      Ketones, UA 40 (2+) mg/dl      Urobilinogen, UA <2.0 mg/dl      Bilirubin, UA Negative     Occult Blood, UA Negative    HS Troponin I 2hr [[id number]]  (Normal) Collected: 11/25/24 0827    Lab Status: Final result Specimen: Blood from Arm, Right Updated: 11/25/24 0902     hs TnI 2hr <2 ng/L      Delta 2hr hsTnI <-1 ng/L     B-Type Natriuretic Peptide(BNP) [[id number]]  (Normal) Collected: 11/25/24 0629    Lab Status: Final result Specimen: Blood  from Arm, Right Updated: 11/25/24 0744     BNP 19 pg/mL     D-dimer, quantitative [[id number]]  (Normal) Collected: 11/25/24 0720    Lab Status: Final result Specimen: Blood from Arm, Right Updated: 11/25/24 0742     D-Dimer, Quant 0.33 ug/ml FEU     Narrative:      In the evaluation for possible pulmonary embolism, in the appropriate (Well's Score of 4 or less) patient, the age adjusted d-dimer cutoff for this patient can be calculated as:    Age x 0.01 (in ug/mL) for Age-adjusted D-dimer exclusion threshold for a patient over 50 years.    HS Troponin 0hr (reflex protocol) [[id number]]  (Normal) Collected: 11/25/24 0629    Lab Status: Final result Specimen: Blood from Arm, Right Updated: 11/25/24 0701     hs TnI 0hr 3 ng/L     FLU/COVID Rapid Antigen (30 min. TAT) - Preferred screening test in ED [[id number]]  (Normal) Collected: 11/25/24 0629    Lab Status: Final result Specimen: Nares from Nose Updated: 11/25/24 0655     SARS COV Rapid Antigen Negative     Influenza A Rapid Antigen Negative     Influenza B Rapid Antigen Negative    Narrative:      This test has been performed using the Quidel Nighat 2 FLU+SARS Antigen test under the Emergency Use Authorization (EUA). This test has been validated by the  and verified by the performing laboratory. The Nighat uses lateral flow immunofluorescent sandwich assay to detect SARS-COV, Influenza A and Influenza B Antigen.     The Quidel Nighat 2 SARS Antigen test does not differentiate between SARS-CoV and SARS-CoV-2.     Negative results are presumptive and may be confirmed with a molecular assay, if necessary, for patient management. Negative results do not rule out SARS-CoV-2 or influenza infection and should not be used as the sole basis for treatment or patient management decisions. A negative test result may occur if the level of antigen in a sample is below the limit of detection of this test.     Positive results are indicative of the presence of viral  antigens, but do not rule out bacterial infection or co-infection with other viruses.     All test results should be used as an adjunct to clinical observations and other information available to the provider.    FOR PEDIATRIC PATIENTS - copy/paste COVID Guidelines URL to browser: [email or web address]    Comprehensive metabolic panel [[id number]]  (Abnormal) Collected: 11/25/24 0629    Lab Status: Final result Specimen: Blood from Arm, Right Updated: 11/25/24 0654     Sodium 138 mmol/L      Potassium 3.9 mmol/L      Chloride 105 mmol/L      CO2 23 mmol/L      ANION GAP 10 mmol/L      BUN 18 mg/dL      Creatinine 0.75 mg/dL      Glucose 154 mg/dL      Calcium 9.0 mg/dL      AST 18 U/L      ALT 20 U/L      Alkaline Phosphatase 74 U/L      Total Protein 7.2 g/dL      Albumin 4.6 g/dL      Total Bilirubin 1.30 mg/dL      eGFR 92 ml/min/1.73sq m     Narrative:      National Kidney Disease Foundation guidelines for Chronic Kidney Disease (CKD):     Stage 1 with normal or high GFR (GFR > 90 mL/min/1.73 square meters)    Stage 2 Mild CKD (GFR = 60-89 mL/min/1.73 square meters)    Stage 3A Moderate CKD (GFR = 45-59 mL/min/1.73 square meters)    Stage 3B Moderate CKD (GFR = 30-44 mL/min/1.73 square meters)    Stage 4 Severe CKD (GFR = 15-29 mL/min/1.73 square meters)    Stage 5 End Stage CKD (GFR <15 mL/min/1.73 square meters)  Note: GFR calculation is accurate only with a steady state creatinine    CBC and differential [[id number]] Collected: 11/25/24 0629    Lab Status: Final result Specimen: Blood from Arm, Right Updated: 11/25/24 0649     WBC 5.79 Thousand/uL      RBC 5.36 Million/uL      Hemoglobin 16.1 g/dL      Hematocrit 47.0 %      MCV 88 fL      MCH 30.0 pg      MCHC 34.3 g/dL      RDW 13.3 %      MPV 9.4 fL      Platelets 198 Thousands/uL      nRBC 0 /100 WBCs      Segmented % 68 %      Immature Grans % 0 %      Lymphocytes % 18 %      Monocytes % 11 %       Eosinophils Relative 2 %      Basophils Relative 1 %      Absolute Neutrophils 3.94 Thousands/µL      Absolute Immature Grans 0.01 Thousand/uL      Absolute Lymphocytes 1.06 Thousands/µL      Absolute Monocytes 0.61 Thousand/µL      Eosinophils Absolute 0.13 Thousand/µL      Basophils Absolute 0.04 Thousands/µL             XR chest 2 views   ED Interpretation by Andi Bryan MD (11/25 0702)   Per my independent interpretation: No acute cardiopulmonary process      Final Interpretation by Js Tolliver MD (11/25 0838)      No acute cardiopulmonary disease.            Workstation performed: [id number]             ECG 12 Lead Documentation Only    Date/Time: 11/25/2024 6:20 AM    Performed by: Andi Chaves MD  Authorized by: Andi Chaves MD    Indications / Diagnosis:  Chest tightness  ECG reviewed by me, the ED Provider: yes    Patient location:  ED  Previous ECG:     Previous ECG:  Compared to current    Similarity:  No change  Interpretation:     Interpretation: normal    Rate:     ECG rate:  92    ECG rate assessment: normal    Rhythm:     Rhythm: sinus rhythm    Ectopy:     Ectopy: none    QRS:     QRS axis:  Normal    QRS intervals:  Normal  Conduction:     Conduction: normal    ST segments:     ST segments:  Normal  T waves:     T waves: normal        ED Medication and Procedure Management   Prior to Admission Medications   Prescriptions Last Dose Informant Patient Reported? Taking?   Albuterol Sulfate (PROAIR RESPICLICK) 108 (90 Base) MCG/ACT AEPB   Yes No   omeprazole (PRILOSEC OTC) 20 MG tablet   Yes No   Sig: Take 20 mg by mouth daily      Facility-Administered Medications: None     Discharge Medication List as of 11/25/2024  9:16 AM        CONTINUE these medications which have NOT CHANGED    Details   Albuterol Sulfate (PROAIR RESPICLICK) 108 (90 Base) MCG/ACT AEPB Starting Wed 9/16/2015, Historical Med      omeprazole (PRILOSEC OTC) 20 MG tablet Take 20 mg by mouth daily, Starting Sat  11/7/2015, Historical [facility]             ED SEPSIS DOCUMENTATION   Time reflects when diagnosis was documented in both MDM as applicable and the Disposition within this note       Time User Action Codes Description Comment    11/25/2024  7:14 AM Andi Chaves Add [R03.0] Elevated blood pressure reading     11/25/2024  7:14 AM Andi Chaves Add [R06.02] Shortness of breath     11/25/2024  7:14 AM Andi Chaves Add [R06.00] Dyspnea     11/25/2024  9:16 AM Andi Chaves Add [R81] Glucosuria     11/25/2024  9:16 AM Andi Bryan A Modify [R03.0] Elevated blood pressure reading     11/25/2024  9:16 AM Andi Bryan A Modify [R06.02] Shortness of breath     11/25/2024  9:16 AM Andi Bryan A Add [R07.9] Acute chest pain     11/25/2024  9:16 AM Shaquille Bryaner A Modify [R06.02] Shortness of breath     11/25/2024  9:16 AM Legdonald, Shaquilleer A Modify [R07.9] Acute chest pain            Initial Sepsis Screening       Row Name 11/25/24 0701                Is the patient's history suggestive of a new or worsening infection? No  -CL                  User Key  (r) = Recorded By, (t) = Taken By, (c) = Cosigned By      Initials Name Provider Type    CL Andi Bryan MD Physician                       Andi Chaves MD  11/25/24 0789

## 2024-11-25 NOTE — DISCHARGE INSTRUCTIONS
You were evaluated in the emergency department for: chest pain. You can access your current and pending results through [facility]. A radiologist will take a second look at your X-Rays, if you had any, and you will be contacted with any new findings.     You should follow-up with your primary care provider, as soon as possible, for re-evaluation.  If you do not have a primary care provider, I have referred you to [facility]. You will be contacted about scheduling an appointment. Their phone number is also included on this paperwork.     Your workup revealed no emergent features at this time; however, many disease processes are dynamic:    Please, return to the emergency department if you experience new or worsening symptoms, fever, chest pain, shortness of breath, difficulty breathing, dizziness, abdominal pain, persistent nausea/vomiting, syncope or passing out, blood in your urine or stool, coughing up blood, leg swelling/pain, urinary retention, bowel or bladder incontinence, numbness between your legs.    Additionally, your blood pressure was measured to be high. This is something that you should discuss with your primary care provider and have re-checked within one week.

## 2024-11-25 NOTE — ED ATTENDING ATTESTATION
11/25/2024  I, Andi Bryan MD, saw and evaluated the patient. I have discussed the patient with the resident/non-physician practitioner and agree with the resident's/non-physician practitioner's findings, Plan of Care, and MDM as documented in the resident's/non-physician practitioner's note, except where noted. All available labs and Radiology studies were reviewed.  I was present for key portions of any procedure(s) performed by the resident/non-physician practitioner and I was immediately available to provide assistance.       At this point I agree with the current assessment done in the Emergency Department.  I have conducted an independent evaluation of this patient a history and physical is as follows:    History    Patient is a 69-year-old male, with a history significant for diabetes per my view the medical record, who presents to the ED today for evaluation of a 2-day history of intermittent, atraumatic, tight in character, nonradiating, exertional/pleuritic/not worsened with laying flat chest pain.  There is associated dyspnea, sore throat, congestion, polyuria, generalized weakness, intermittent abdominal pain.  Patient denies diarrhea, sick contacts, fever, dysphagia, vision change, numbness.  Patient took 2 unknown pills of an analgesic medication to try and remit his symptoms.  Patient states that he used to smoke.  Patient is without other concerns at this time.    Patient is without other concerns at this time.     ROS  Patient denies: Fever; dysphagia; vision change; polyuria; dysuria; rash; numbness; difficulty walking; confusion    Physical Exam    GENERAL APPEARANCE: NAD  NEURO: Patient is speaking clearly in complete sentences.  Patient is answering appropriately and able follow commands.  Patient is moving all four extremities spontaneously.  No facial droop.  Tongue midline.  HEENT: PERRL, Moist mucous membranes, external ears normal, nose normal  Neck: No cervical  adenopathy  CV: RRR. No murmurs, rubs, gallops.  2+ radial and DP pulses bilaterally.  LUNGS: Clear to auscultation: No wheezes, stridor, rhonchi, rales  GI: Abdomen non-distended. Soft. Non-tender and without rebound or guarding.  Negative Fernando sign, negative McBurney point, no CVA tenderness.  : Deferred at this time  MSK: No deformity.  No lower extremity edema.  No pain with calf squeeze.  Skin: Warm and dry  Capillary refill: <2 seconds    Patient is currently afebrile, tachypneic, otherwise stable    Assessment/Plan/MDM  Chest pain, dyspnea, abdominal pain (not present), polyuria, sore throat, congestion  -This presentation is concerning for: ACS, pneumothorax, anemia, electrolyte abnormality, polyuria from hyperglycemia.  No reason to suspect dissection, pericarditis based on history physical exam.  Patient at risk for pneumonia/UTI/viral syndrome  -Will investigate with cardiac workup, UA, viral testing  -Will manage with acetaminophen and further based upon workup.    ED Course  ED Course as of 11/25/24 0918   Mon Nov 25, 2024   0622 ECG per my independent interpretation: Normal rate, regular rhythm, no ectopy, normal axis, no ST elevations or depressions     0658 Total Bilirubin(!): 1.30  High, similar to prior    0701 No focal consolidation on CXR. Given dyspnea/tachypnea/clear lungs, will acquire D dimer to evaluation for low-pretest probability PE    0701 hs TnI 0hr: 3  WNL    0744 D-Dimer, Quant: 0.33  WNL -given low pretest probability, no reason to suspect PE/VTE   0903 Delta 2hr hsTnI: <-1  WNL   0910 On reevaluation, patient has improvement in symptoms         Critical Care Time  Procedures

## 2024-11-25 NOTE — SEPSIS NOTE
Sepsis Note   Marko Su 69 y.o. male MRN: [id number]  Unit/Bed#: ED-21 Encounter: [id number]       Initial Sepsis Screening       Row Name 11/25/24 0701                Is the patient's history suggestive of a new or worsening infection? No  -CL                  User Key  (r) = Recorded By, (t) = Taken By, (c) = Cosigned By      Initials Name Provider Type    CL Andi Bryan MD Physician                        Body mass index is 27.57 kg/m².  Wt Readings from Last 1 Encounters:   11/25/24 92.2 kg (203 lb 4.2 oz)        Ideal body weight: 77.6 kg (171 lb 1.2 oz)  Adjusted ideal body weight: 83.4 kg (183 lb 15.2 oz)